# Patient Record
Sex: FEMALE | Race: WHITE | NOT HISPANIC OR LATINO | Employment: OTHER | URBAN - METROPOLITAN AREA
[De-identification: names, ages, dates, MRNs, and addresses within clinical notes are randomized per-mention and may not be internally consistent; named-entity substitution may affect disease eponyms.]

---

## 2023-03-21 LAB
INR IN PPP BY COAGULATION ASSAY: 1.4 (ref 0.9–1.1)
PROTHROMBIN TIME (PT) IN PPP BY COAGULATION ASSAY: 15.9 SEC (ref 9.8–13.4)

## 2023-03-28 LAB
INR IN PPP BY COAGULATION ASSAY: 1.9 (ref 0.9–1.1)
PROTHROMBIN TIME (PT) IN PPP BY COAGULATION ASSAY: 21.8 SEC (ref 9.8–13.4)

## 2023-04-04 LAB
INR IN PPP BY COAGULATION ASSAY: 2.5 (ref 0.9–1.1)
PROTHROMBIN TIME (PT) IN PPP BY COAGULATION ASSAY: 28.9 SEC (ref 9.8–13.4)

## 2023-04-11 LAB
ANION GAP IN SER/PLAS: 14 MMOL/L (ref 10–20)
CALCIUM (MG/DL) IN SER/PLAS: 8.8 MG/DL (ref 8.6–10.3)
CARBON DIOXIDE, TOTAL (MMOL/L) IN SER/PLAS: 24 MMOL/L (ref 21–32)
CHLORIDE (MMOL/L) IN SER/PLAS: 102 MMOL/L (ref 98–107)
CREATININE (MG/DL) IN SER/PLAS: 1.33 MG/DL (ref 0.5–1.05)
DIGOXIN (NG/ML) IN SER/PLAS: 1.76 NG/ML (ref 0.8–2)
ERYTHROCYTE DISTRIBUTION WIDTH (RATIO) BY AUTOMATED COUNT: 14 % (ref 11.5–14.5)
ERYTHROCYTE MEAN CORPUSCULAR HEMOGLOBIN CONCENTRATION (G/DL) BY AUTOMATED: 32.4 G/DL (ref 32–36)
ERYTHROCYTE MEAN CORPUSCULAR VOLUME (FL) BY AUTOMATED COUNT: 92 FL (ref 80–100)
ERYTHROCYTES (10*6/UL) IN BLOOD BY AUTOMATED COUNT: 4.44 X10E12/L (ref 4–5.2)
GFR FEMALE: 42 ML/MIN/1.73M2
GLUCOSE (MG/DL) IN SER/PLAS: 93 MG/DL (ref 74–99)
HEMATOCRIT (%) IN BLOOD BY AUTOMATED COUNT: 40.8 % (ref 36–46)
HEMOGLOBIN (G/DL) IN BLOOD: 13.2 G/DL (ref 12–16)
INR IN PPP BY COAGULATION ASSAY: 5.7 (ref 0.9–1.1)
LEUKOCYTES (10*3/UL) IN BLOOD BY AUTOMATED COUNT: 4.9 X10E9/L (ref 4.4–11.3)
NRBC (PER 100 WBCS) BY AUTOMATED COUNT: 0 /100 WBC (ref 0–0)
PLATELETS (10*3/UL) IN BLOOD AUTOMATED COUNT: 251 X10E9/L (ref 150–450)
POTASSIUM (MMOL/L) IN SER/PLAS: 4.9 MMOL/L (ref 3.5–5.3)
PROTHROMBIN TIME (PT) IN PPP BY COAGULATION ASSAY: 67.7 SEC (ref 9.8–13.4)
SODIUM (MMOL/L) IN SER/PLAS: 135 MMOL/L (ref 136–145)
THYROTROPIN (MIU/L) IN SER/PLAS BY DETECTION LIMIT <= 0.05 MIU/L: 2.69 MIU/L (ref 0.44–3.98)
UREA NITROGEN (MG/DL) IN SER/PLAS: 19 MG/DL (ref 6–23)

## 2023-04-18 LAB
INR IN PPP BY COAGULATION ASSAY: 2.1 (ref 0.9–1.1)
PROTHROMBIN TIME (PT) IN PPP BY COAGULATION ASSAY: 24.1 SEC (ref 9.8–13.4)

## 2023-05-09 LAB
INR IN PPP BY COAGULATION ASSAY: 3 (ref 0.9–1.1)
PROTHROMBIN TIME (PT) IN PPP BY COAGULATION ASSAY: 34.6 SEC (ref 9.8–13.4)

## 2023-05-16 LAB
INR IN PPP BY COAGULATION ASSAY: 3.1 (ref 0.9–1.1)
PROTHROMBIN TIME (PT) IN PPP BY COAGULATION ASSAY: 35.8 SEC (ref 9.8–13.4)

## 2023-06-06 LAB
INR IN PPP BY COAGULATION ASSAY: 2.1 (ref 0.9–1.1)
PROTHROMBIN TIME (PT) IN PPP BY COAGULATION ASSAY: 24.3 SEC (ref 9.8–13.4)

## 2023-06-20 LAB
ALANINE AMINOTRANSFERASE (SGPT) (U/L) IN SER/PLAS: 26 U/L (ref 7–45)
ALBUMIN (G/DL) IN SER/PLAS: 3.7 G/DL (ref 3.4–5)
ALKALINE PHOSPHATASE (U/L) IN SER/PLAS: 78 U/L (ref 33–136)
ANION GAP IN SER/PLAS: 13 MMOL/L (ref 10–20)
ASPARTATE AMINOTRANSFERASE (SGOT) (U/L) IN SER/PLAS: 30 U/L (ref 9–39)
BILIRUBIN TOTAL (MG/DL) IN SER/PLAS: 1.1 MG/DL (ref 0–1.2)
CALCIDIOL (25 OH VITAMIN D3) (NG/ML) IN SER/PLAS: 49 NG/ML
CALCIUM (MG/DL) IN SER/PLAS: 9.1 MG/DL (ref 8.6–10.3)
CARBON DIOXIDE, TOTAL (MMOL/L) IN SER/PLAS: 30 MMOL/L (ref 21–32)
CHLORIDE (MMOL/L) IN SER/PLAS: 101 MMOL/L (ref 98–107)
CHOLESTEROL (MG/DL) IN SER/PLAS: 146 MG/DL (ref 0–199)
CHOLESTEROL IN HDL (MG/DL) IN SER/PLAS: 36.5 MG/DL
CHOLESTEROL/HDL RATIO: 4
CREATININE (MG/DL) IN SER/PLAS: 1.34 MG/DL (ref 0.5–1.05)
ERYTHROCYTE DISTRIBUTION WIDTH (RATIO) BY AUTOMATED COUNT: 13.2 % (ref 11.5–14.5)
ERYTHROCYTE MEAN CORPUSCULAR HEMOGLOBIN CONCENTRATION (G/DL) BY AUTOMATED: 31.9 G/DL (ref 32–36)
ERYTHROCYTE MEAN CORPUSCULAR VOLUME (FL) BY AUTOMATED COUNT: 96 FL (ref 80–100)
ERYTHROCYTES (10*6/UL) IN BLOOD BY AUTOMATED COUNT: 4.22 X10E12/L (ref 4–5.2)
GFR FEMALE: 41 ML/MIN/1.73M2
GLUCOSE (MG/DL) IN SER/PLAS: 104 MG/DL (ref 74–99)
HEMATOCRIT (%) IN BLOOD BY AUTOMATED COUNT: 40.7 % (ref 36–46)
HEMOGLOBIN (G/DL) IN BLOOD: 13 G/DL (ref 12–16)
INR IN PPP BY COAGULATION ASSAY: 3.2 (ref 0.9–1.1)
INR IN PPP BY COAGULATION ASSAY: NORMAL
LDL: 83 MG/DL (ref 0–99)
LEUKOCYTES (10*3/UL) IN BLOOD BY AUTOMATED COUNT: 7 X10E9/L (ref 4.4–11.3)
NRBC (PER 100 WBCS) BY AUTOMATED COUNT: 0 /100 WBC (ref 0–0)
PLATELETS (10*3/UL) IN BLOOD AUTOMATED COUNT: 261 X10E9/L (ref 150–450)
POTASSIUM (MMOL/L) IN SER/PLAS: 3.2 MMOL/L (ref 3.5–5.3)
PROTEIN TOTAL: 7 G/DL (ref 6.4–8.2)
PROTHROMBIN TIME (PT) IN PPP BY COAGULATION ASSAY: 37.4 SEC (ref 9.8–13.4)
PROTHROMBIN TIME (PT) IN PPP BY COAGULATION ASSAY: NORMAL
SODIUM (MMOL/L) IN SER/PLAS: 141 MMOL/L (ref 136–145)
TRIGLYCERIDE (MG/DL) IN SER/PLAS: 135 MG/DL (ref 0–149)
UREA NITROGEN (MG/DL) IN SER/PLAS: 17 MG/DL (ref 6–23)
VLDL: 27 MG/DL (ref 0–40)

## 2023-08-08 LAB
INR IN PPP BY COAGULATION ASSAY: 2.3 (ref 0.9–1.1)
PROTHROMBIN TIME (PT) IN PPP BY COAGULATION ASSAY: 26.7 SEC (ref 9.8–12.8)

## 2023-08-22 LAB
INR IN PPP BY COAGULATION ASSAY: 2.5 (ref 0.9–1.1)
PROTHROMBIN TIME (PT) IN PPP BY COAGULATION ASSAY: 28.8 SEC (ref 9.8–12.8)

## 2023-09-12 LAB
INR IN PPP BY COAGULATION ASSAY: 2.3 (ref 0.9–1.1)
PROTHROMBIN TIME (PT) IN PPP BY COAGULATION ASSAY: 26.3 SEC (ref 9.8–12.8)

## 2023-10-03 ENCOUNTER — LAB REQUISITION (OUTPATIENT)
Dept: LAB | Facility: HOSPITAL | Age: 73
End: 2023-10-03
Payer: COMMERCIAL

## 2023-10-03 DIAGNOSIS — Z79.01 LONG TERM (CURRENT) USE OF ANTICOAGULANTS: ICD-10-CM

## 2023-10-03 LAB
INR PPP: 2.1 (ref 0.9–1.1)
PROTHROMBIN TIME: 23.4 SECONDS (ref 9.8–12.8)

## 2023-10-03 PROCEDURE — 85610 PROTHROMBIN TIME: CPT | Mod: OUT | Performed by: INTERNAL MEDICINE

## 2023-10-10 ENCOUNTER — LAB REQUISITION (OUTPATIENT)
Dept: LAB | Facility: HOSPITAL | Age: 73
End: 2023-10-10
Payer: COMMERCIAL

## 2023-10-10 DIAGNOSIS — Z79.01 LONG TERM (CURRENT) USE OF ANTICOAGULANTS: ICD-10-CM

## 2023-10-10 LAB
INR PPP: 2.2 (ref 0.9–1.1)
PROTHROMBIN TIME: 24.9 SECONDS (ref 9.8–12.8)

## 2023-10-10 PROCEDURE — 85610 PROTHROMBIN TIME: CPT | Mod: OUT | Performed by: INTERNAL MEDICINE

## 2023-10-20 PROBLEM — I50.9 CONGESTIVE HEART FAILURE (CHF) (MULTI): Status: ACTIVE | Noted: 2023-10-20

## 2023-10-20 PROBLEM — N18.30 CKD (CHRONIC KIDNEY DISEASE), STAGE III (MULTI): Status: ACTIVE | Noted: 2020-06-24

## 2023-10-20 PROBLEM — R63.4 ABNORMAL WEIGHT LOSS: Status: ACTIVE | Noted: 2023-10-20

## 2023-10-20 PROBLEM — R53.1 GENERALIZED WEAKNESS: Status: ACTIVE | Noted: 2019-10-14

## 2023-10-20 PROBLEM — M85.89 OSTEOPENIA OF MULTIPLE SITES: Status: ACTIVE | Noted: 2022-02-23

## 2023-10-20 PROBLEM — I48.91 A-FIB (MULTI): Status: ACTIVE | Noted: 2023-10-20

## 2023-10-20 PROBLEM — E78.5 HYPERLIPIDEMIA: Status: ACTIVE | Noted: 2023-10-20

## 2023-10-20 PROBLEM — I10 HYPERTENSION: Status: ACTIVE | Noted: 2023-10-20

## 2023-10-20 PROBLEM — E44.0 MODERATE PROTEIN-CALORIE MALNUTRITION (MULTI): Status: ACTIVE | Noted: 2019-07-22

## 2023-10-20 PROBLEM — T45.511A WARFARIN TOXICITY: Status: ACTIVE | Noted: 2019-07-22

## 2023-10-20 PROBLEM — D68.9 COAGULOPATHY (MULTI): Status: ACTIVE | Noted: 2019-09-28

## 2023-10-20 PROBLEM — R23.2 HOT FLASHES: Status: ACTIVE | Noted: 2022-02-23

## 2023-10-20 RX ORDER — ADHESIVE BANDAGE
30 BANDAGE TOPICAL
COMMUNITY

## 2023-10-20 RX ORDER — METOPROLOL TARTRATE 100 MG/1
100 TABLET ORAL EVERY 12 HOURS
COMMUNITY
Start: 2019-10-04 | End: 2023-10-27

## 2023-10-20 RX ORDER — PNV NO.95/FERROUS FUM/FOLIC AC 28MG-0.8MG
2 TABLET ORAL
COMMUNITY

## 2023-10-20 RX ORDER — POTASSIUM CHLORIDE 750 MG/1
10 TABLET, FILM COATED, EXTENDED RELEASE ORAL DAILY
COMMUNITY

## 2023-10-20 RX ORDER — TRIAMCINOLONE ACETONIDE 1 MG/G
CREAM TOPICAL 2 TIMES DAILY
COMMUNITY
End: 2024-06-03 | Stop reason: ALTCHOICE

## 2023-10-20 RX ORDER — KETOCONAZOLE 20 MG/ML
SHAMPOO, SUSPENSION TOPICAL
COMMUNITY

## 2023-10-20 RX ORDER — ALENDRONATE SODIUM 70 MG/1
TABLET ORAL
COMMUNITY
Start: 2020-07-27

## 2023-10-20 RX ORDER — MIDODRINE HYDROCHLORIDE 5 MG/1
5 TABLET ORAL 3 TIMES DAILY
COMMUNITY
Start: 2023-04-15

## 2023-10-20 RX ORDER — MECLIZINE HYDROCHLORIDE 25 MG/1
TABLET ORAL
COMMUNITY

## 2023-10-20 RX ORDER — GUAIFENESIN/DEXTROMETHORPHAN 100-10MG/5
SYRUP ORAL
COMMUNITY

## 2023-10-20 RX ORDER — DIGOXIN 125 MCG
TABLET ORAL
COMMUNITY
Start: 2023-04-14

## 2023-10-20 RX ORDER — ACETAMINOPHEN 325 MG/1
650 TABLET ORAL EVERY 8 HOURS PRN
COMMUNITY
Start: 2019-07-22

## 2023-10-20 RX ORDER — AMIODARONE HYDROCHLORIDE 200 MG/1
0.5 TABLET ORAL DAILY
COMMUNITY

## 2023-10-20 RX ORDER — ONDANSETRON 4 MG/1
TABLET, FILM COATED ORAL
COMMUNITY

## 2023-10-20 RX ORDER — VIT C/E/ZN/COPPR/LUTEIN/ZEAXAN 250MG-90MG
2000 CAPSULE ORAL
COMMUNITY
Start: 2020-01-06

## 2023-10-20 RX ORDER — ATORVASTATIN CALCIUM 10 MG/1
1 TABLET, FILM COATED ORAL NIGHTLY
COMMUNITY
Start: 2019-08-27

## 2023-10-20 RX ORDER — ANASTROZOLE 1 MG/1
1 TABLET ORAL
COMMUNITY
Start: 2021-02-11

## 2023-10-20 RX ORDER — FUROSEMIDE 20 MG/1
20 TABLET ORAL
COMMUNITY
Start: 2019-07-22 | End: 2023-10-27

## 2023-10-20 RX ORDER — POLYETHYLENE GLYCOL 3350 17 G/17G
POWDER, FOR SOLUTION ORAL
COMMUNITY

## 2023-10-20 RX ORDER — WARFARIN 2 MG/1
2 TABLET ORAL
COMMUNITY
Start: 2020-08-03 | End: 2024-04-02 | Stop reason: ALTCHOICE

## 2023-10-20 RX ORDER — AMMONIUM LACTATE 12 G/100G
LOTION TOPICAL
COMMUNITY

## 2023-10-20 RX ORDER — DIPHENHYDRAMINE HCL 25 MG
25 CAPSULE ORAL EVERY 6 HOURS PRN
COMMUNITY
End: 2024-06-03 | Stop reason: ALTCHOICE

## 2023-10-20 RX ORDER — HYDROCORTISONE 1 %
CREAM (GRAM) TOPICAL
COMMUNITY

## 2023-10-20 RX ORDER — OMEPRAZOLE 40 MG/1
40 CAPSULE, DELAYED RELEASE ORAL 2 TIMES DAILY
COMMUNITY

## 2023-10-20 RX ORDER — OMEPRAZOLE 20 MG/1
20 TABLET, DELAYED RELEASE ORAL DAILY
COMMUNITY
End: 2024-06-03 | Stop reason: ALTCHOICE

## 2023-10-23 ENCOUNTER — APPOINTMENT (OUTPATIENT)
Dept: CARDIOLOGY | Facility: CLINIC | Age: 73
End: 2023-10-23
Payer: COMMERCIAL

## 2023-10-24 ENCOUNTER — LAB REQUISITION (OUTPATIENT)
Dept: LAB | Facility: HOSPITAL | Age: 73
End: 2023-10-24
Payer: COMMERCIAL

## 2023-10-24 DIAGNOSIS — Z79.01 LONG TERM (CURRENT) USE OF ANTICOAGULANTS: ICD-10-CM

## 2023-10-24 LAB
INR PPP: 1.7 (ref 0.9–1.1)
PROTHROMBIN TIME: 19.8 SECONDS (ref 9.8–12.8)

## 2023-10-24 PROCEDURE — 85610 PROTHROMBIN TIME: CPT | Mod: OUT | Performed by: INTERNAL MEDICINE

## 2023-10-27 ENCOUNTER — LAB REQUISITION (OUTPATIENT)
Dept: LAB | Facility: HOSPITAL | Age: 73
End: 2023-10-27
Payer: COMMERCIAL

## 2023-10-27 DIAGNOSIS — Z79.01 LONG TERM (CURRENT) USE OF ANTICOAGULANTS: ICD-10-CM

## 2023-10-27 LAB
INR PPP: 1.7 (ref 0.9–1.1)
PROTHROMBIN TIME: 19.7 SECONDS (ref 9.8–12.8)

## 2023-10-27 PROCEDURE — 85610 PROTHROMBIN TIME: CPT | Mod: OUT | Performed by: INTERNAL MEDICINE

## 2023-10-27 RX ORDER — METOPROLOL TARTRATE 50 MG/1
TABLET ORAL
COMMUNITY
Start: 2023-06-21

## 2023-10-27 RX ORDER — ALBUTEROL SULFATE 0.83 MG/ML
SOLUTION RESPIRATORY (INHALATION)
COMMUNITY
Start: 2023-02-17 | End: 2024-06-03 | Stop reason: ALTCHOICE

## 2023-10-27 RX ORDER — FUROSEMIDE 40 MG/1
20 TABLET ORAL DAILY
COMMUNITY
Start: 2023-06-25 | End: 2024-06-03 | Stop reason: ALTCHOICE

## 2023-10-27 RX ORDER — WARFARIN 1 MG/1
TABLET ORAL
COMMUNITY
Start: 2023-10-15 | End: 2024-04-02 | Stop reason: ALTCHOICE

## 2023-10-27 RX ORDER — MIRTAZAPINE 15 MG/1
TABLET, FILM COATED ORAL
COMMUNITY
Start: 2023-07-07 | End: 2024-06-03 | Stop reason: ALTCHOICE

## 2023-10-30 ENCOUNTER — OFFICE VISIT (OUTPATIENT)
Dept: CARDIOLOGY | Facility: CLINIC | Age: 73
End: 2023-10-30
Payer: COMMERCIAL

## 2023-10-30 VITALS
OXYGEN SATURATION: 97 % | HEART RATE: 93 BPM | SYSTOLIC BLOOD PRESSURE: 110 MMHG | BODY MASS INDEX: 24.48 KG/M2 | DIASTOLIC BLOOD PRESSURE: 80 MMHG | WEIGHT: 156 LBS | HEIGHT: 67 IN

## 2023-10-30 DIAGNOSIS — E78.00 PURE HYPERCHOLESTEROLEMIA: ICD-10-CM

## 2023-10-30 DIAGNOSIS — I10 PRIMARY HYPERTENSION: ICD-10-CM

## 2023-10-30 DIAGNOSIS — I48.0 PAROXYSMAL ATRIAL FIBRILLATION (MULTI): Primary | ICD-10-CM

## 2023-10-30 DIAGNOSIS — C50.919 MALIGNANT NEOPLASM OF FEMALE BREAST, UNSPECIFIED ESTROGEN RECEPTOR STATUS, UNSPECIFIED LATERALITY, UNSPECIFIED SITE OF BREAST (MULTI): ICD-10-CM

## 2023-10-30 DIAGNOSIS — I50.32 CHRONIC HEART FAILURE WITH PRESERVED EJECTION FRACTION (MULTI): ICD-10-CM

## 2023-10-30 DIAGNOSIS — N18.30 STAGE 3 CHRONIC KIDNEY DISEASE, UNSPECIFIED WHETHER STAGE 3A OR 3B CKD (MULTI): ICD-10-CM

## 2023-10-30 PROCEDURE — 99214 OFFICE O/P EST MOD 30 MIN: CPT | Performed by: INTERNAL MEDICINE

## 2023-10-30 PROCEDURE — 1160F RVW MEDS BY RX/DR IN RCRD: CPT | Performed by: INTERNAL MEDICINE

## 2023-10-30 PROCEDURE — 93000 ELECTROCARDIOGRAM COMPLETE: CPT | Performed by: INTERNAL MEDICINE

## 2023-10-30 PROCEDURE — 3074F SYST BP LT 130 MM HG: CPT | Performed by: INTERNAL MEDICINE

## 2023-10-30 PROCEDURE — 1159F MED LIST DOCD IN RCRD: CPT | Performed by: INTERNAL MEDICINE

## 2023-10-30 PROCEDURE — 3079F DIAST BP 80-89 MM HG: CPT | Performed by: INTERNAL MEDICINE

## 2023-10-30 PROCEDURE — 1036F TOBACCO NON-USER: CPT | Performed by: INTERNAL MEDICINE

## 2023-10-30 NOTE — PROGRESS NOTES
Slightly in creased dyspnea from anxiety. No palapitions, no chest pain, no edema.      Review of Systems     Physical Exam  Constitutional:       Appearance: Normal appearance.   HENT:      Head: Normocephalic and atraumatic.   Eyes:      Pupils: Pupils are equal, round, and reactive to light.   Cardiovascular:      Rate and Rhythm: Rhythm irregular.      Heart sounds: S1 normal and S2 normal.   Musculoskeletal:      Right lower leg: No edema.      Left lower leg: No edema.   Neurological:      Mental Status: She is alert.          Problem List Items Addressed This Visit          Cardiac and Vasculature    A-fib (CMS/LTAC, located within [facility]) - Primary    Overview     Last Assessment & Plan: Formatting of this note might be different from the original. Assessment: Chronic and taking meds and stable.         Relevant Medications    metoprolol tartrate (Lopressor) 50 mg tablet    Hyperlipidemia    Overview     Last Assessment & Plan: Formatting of this note might be different from the original. Assessment: on Rx, managed by [facility] physician Dr. Landers         Hypertension    Overview     Last Assessment & Plan: Formatting of this note might be different from the original. Assessment: Well controlled with medication and FU routinely with PCP         Relevant Orders    ECG 12 Lead (Completed)    Chronic heart failure with preserved ejection fraction (CMS/LTAC, located within [facility])    Relevant Medications    metoprolol tartrate (Lopressor) 50 mg tablet       Genitourinary and Reproductive    CKD (chronic kidney disease), stage III (CMS/LTAC, located within [facility])    Overview     Last Assessment & Plan: Formatting of this note might be different from the original. Assessment: encouraged to drink more water and FU with PCP            Hematology and Neoplasia    Malignant neoplasm of female breast (CMS/LTAC, located within [facility])

## 2023-11-03 ENCOUNTER — LAB REQUISITION (OUTPATIENT)
Dept: LAB | Facility: HOSPITAL | Age: 73
End: 2023-11-03
Payer: COMMERCIAL

## 2023-11-03 DIAGNOSIS — Z79.01 LONG TERM (CURRENT) USE OF ANTICOAGULANTS: ICD-10-CM

## 2023-11-03 LAB
INR PPP: 1.8 (ref 0.9–1.1)
PROTHROMBIN TIME: 20.8 SECONDS (ref 9.8–12.8)

## 2023-11-03 PROCEDURE — 85610 PROTHROMBIN TIME: CPT | Mod: OUT | Performed by: INTERNAL MEDICINE

## 2023-11-10 ENCOUNTER — LAB REQUISITION (OUTPATIENT)
Dept: LAB | Facility: HOSPITAL | Age: 73
End: 2023-11-10
Payer: COMMERCIAL

## 2023-11-10 DIAGNOSIS — I48.91 UNSPECIFIED ATRIAL FIBRILLATION (MULTI): ICD-10-CM

## 2023-11-10 LAB
INR PPP: 2.7 (ref 0.9–1.1)
PREALB SERPL-MCNC: 23.2 MG/DL (ref 18–40)
PROTHROMBIN TIME: 31.3 SECONDS (ref 9.8–12.8)

## 2023-11-10 PROCEDURE — 84134 ASSAY OF PREALBUMIN: CPT | Mod: OUT,PARLAB | Performed by: INTERNAL MEDICINE

## 2023-11-10 PROCEDURE — 85610 PROTHROMBIN TIME: CPT | Mod: OUT | Performed by: INTERNAL MEDICINE

## 2023-11-17 ENCOUNTER — LAB REQUISITION (OUTPATIENT)
Dept: LAB | Facility: HOSPITAL | Age: 73
End: 2023-11-17
Payer: COMMERCIAL

## 2023-11-17 DIAGNOSIS — I48.91 UNSPECIFIED ATRIAL FIBRILLATION (MULTI): ICD-10-CM

## 2023-11-17 LAB
INR PPP: 2.8 (ref 0.9–1.1)
PROTHROMBIN TIME: 32.3 SECONDS (ref 9.8–12.8)

## 2023-11-17 PROCEDURE — 85610 PROTHROMBIN TIME: CPT | Mod: OUT | Performed by: INTERNAL MEDICINE

## 2023-11-24 ENCOUNTER — LAB REQUISITION (OUTPATIENT)
Dept: LAB | Facility: HOSPITAL | Age: 73
End: 2023-11-24
Payer: COMMERCIAL

## 2023-11-24 DIAGNOSIS — I48.91 UNSPECIFIED ATRIAL FIBRILLATION (MULTI): ICD-10-CM

## 2023-11-24 LAB
INR PPP: 3.1 (ref 0.9–1.1)
PROTHROMBIN TIME: 35 SECONDS (ref 9.8–12.8)

## 2023-11-24 PROCEDURE — 85610 PROTHROMBIN TIME: CPT | Mod: OUT | Performed by: INTERNAL MEDICINE

## 2023-11-28 ENCOUNTER — LAB REQUISITION (OUTPATIENT)
Dept: LAB | Facility: HOSPITAL | Age: 73
End: 2023-11-28
Payer: COMMERCIAL

## 2023-11-28 DIAGNOSIS — I48.91 UNSPECIFIED ATRIAL FIBRILLATION (MULTI): ICD-10-CM

## 2023-11-28 LAB
INR PPP: 1.9 (ref 0.9–1.1)
PROTHROMBIN TIME: 21 SECONDS (ref 9.8–12.8)

## 2023-11-28 PROCEDURE — 85610 PROTHROMBIN TIME: CPT | Mod: OUT | Performed by: INTERNAL MEDICINE

## 2024-02-12 PROBLEM — I48.91 ATRIAL FIBRILLATION (MULTI): Status: ACTIVE | Noted: 2023-09-19

## 2024-02-12 PROBLEM — E78.00 PURE HYPERCHOLESTEROLEMIA: Status: ACTIVE | Noted: 2023-03-18

## 2024-02-12 PROBLEM — E78.00 PURE HYPERCHOLESTEROLEMIA: Status: RESOLVED | Noted: 2023-03-18 | Resolved: 2024-02-12

## 2024-03-26 PROBLEM — R11.2 NAUSEA AND VOMITING: Status: ACTIVE | Noted: 2024-03-26

## 2024-04-02 ENCOUNTER — OFFICE VISIT (OUTPATIENT)
Dept: CARDIOLOGY | Facility: CLINIC | Age: 74
End: 2024-04-02
Payer: COMMERCIAL

## 2024-04-02 VITALS
HEART RATE: 97 BPM | OXYGEN SATURATION: 98 % | DIASTOLIC BLOOD PRESSURE: 64 MMHG | SYSTOLIC BLOOD PRESSURE: 110 MMHG | WEIGHT: 168 LBS | HEIGHT: 67 IN | BODY MASS INDEX: 26.37 KG/M2

## 2024-04-02 DIAGNOSIS — I10 PRIMARY HYPERTENSION: ICD-10-CM

## 2024-04-02 DIAGNOSIS — I48.11 LONGSTANDING PERSISTENT ATRIAL FIBRILLATION (MULTI): Primary | ICD-10-CM

## 2024-04-02 DIAGNOSIS — N18.30 STAGE 3 CHRONIC KIDNEY DISEASE, UNSPECIFIED WHETHER STAGE 3A OR 3B CKD (MULTI): ICD-10-CM

## 2024-04-02 DIAGNOSIS — E78.00 PURE HYPERCHOLESTEROLEMIA: ICD-10-CM

## 2024-04-02 DIAGNOSIS — I50.32 CHRONIC HEART FAILURE WITH PRESERVED EJECTION FRACTION (MULTI): ICD-10-CM

## 2024-04-02 DIAGNOSIS — C50.919 MALIGNANT NEOPLASM OF FEMALE BREAST, UNSPECIFIED ESTROGEN RECEPTOR STATUS, UNSPECIFIED LATERALITY, UNSPECIFIED SITE OF BREAST (MULTI): ICD-10-CM

## 2024-04-02 PROCEDURE — 3074F SYST BP LT 130 MM HG: CPT | Performed by: INTERNAL MEDICINE

## 2024-04-02 PROCEDURE — 1160F RVW MEDS BY RX/DR IN RCRD: CPT | Performed by: INTERNAL MEDICINE

## 2024-04-02 PROCEDURE — 1159F MED LIST DOCD IN RCRD: CPT | Performed by: INTERNAL MEDICINE

## 2024-04-02 PROCEDURE — 99214 OFFICE O/P EST MOD 30 MIN: CPT | Performed by: INTERNAL MEDICINE

## 2024-04-02 PROCEDURE — 3078F DIAST BP <80 MM HG: CPT | Performed by: INTERNAL MEDICINE

## 2024-04-02 PROCEDURE — 1036F TOBACCO NON-USER: CPT | Performed by: INTERNAL MEDICINE

## 2024-04-02 NOTE — PROGRESS NOTES
"  Subjective  Adriana Holder  is a 73 y.o. year old female who presents for chronic atrial fibrillation.  No chest pain, no dyspnea, no palpaitions, no edema.  Notes post nasal drip.    Blood pressure 110/64, pulse 97, height 1.702 m (5' 7\"), weight 76.2 kg (168 lb), SpO2 98 %.   Ibuprofen and Codeine  History reviewed. No pertinent past medical history.  History reviewed. No pertinent surgical history.  Family History   Problem Relation Name Age of Onset    Coronary artery disease Mother      Heart attack Mother       @SOC    Current Outpatient Medications   Medication Sig Dispense Refill    furosemide (Lasix) 40 mg tablet Take 0.5 tablets (20 mg) by mouth once daily.      acetaminophen (Tylenol) 325 mg tablet Take 2 tablets (650 mg) by mouth every 8 hours if needed.      albuterol 2.5 mg /3 mL (0.083 %) nebulizer solution       alendronate (Fosamax) 70 mg tablet TAKE 1 TABLET ONCE A WEEK IN MORNING WITH GLASS OF WATER EMPTY STOMACH DONT LIE DOWN FOR 30 MINUTES      amiodarone (Pacerone) 200 mg tablet Take 0.5 tablets (100 mg) by mouth once daily.      ammonium lactate (Lac-Hydrin) 12 % lotion Apply and rub in a thin film to affected areas twice daily (am and pm).      anastrozole (Arimidex) 1 mg tablet Take 1 tablet (1 mg total) by mouth once daily.      apixaban (Eliquis) 5 mg tablet Take 1 tablet (5 mg) by mouth 2 times a day.      atorvastatin (Lipitor) 10 mg tablet Take 1 tablet (10 mg) by mouth once daily at bedtime.      BISACODYL ORAL Insert 1 suppository rectally once daily      calcium carbonate-vitamin D3 (Oscal-500) 500 mg-10 mcg (400 unit) tablet Take 2 tablets by mouth once daily.      calcium carbonate-vitamin D3 600 mg-5 mcg (200 unit) capsule Take 2 capsules by mouth once daily.      cholecalciferol (Vitamin D-3) 25 MCG (1000 UT) capsule Take 2 capsules (50 mcg) by mouth once daily.      dextromethorphan-guaifenesin (Robitussin DM)  mg/5 mL oral liquid Take by mouth.      digoxin (Lanoxin) 125 " MCG tablet Take by mouth.      diphenhydrAMINE (BENADryl) 25 mg capsule Take 1 capsule (25 mg) by mouth every 6 hours if needed.      DOCUSATE SODIUM ORAL Take by mouth.      hydrocortisone 1 % cream Apply sparingly and rub in well to affected area (s) as directed.      ketoconazole (NIZOral) 2 % shampoo Shampoo hair/scalp as directed      magnesium hydroxide (Milk of Magnesia) 400 mg/5 mL suspension Take 30 mL by mouth once daily.      meclizine (Antivert) 25 mg tablet Take by mouth.      metoprolol tartrate (Lopressor) 50 mg tablet       midodrine (Proamatine) 5 mg tablet Take 1 tablet (5 mg) by mouth 3 times a day.      mirtazapine (Remeron) 15 mg tablet       omeprazole (PriLOSEC) 40 mg DR capsule Take 1 capsule (40 mg) by mouth 2 times a day. Do not crush or chew.      omeprazole OTC (PriLOSEC OTC) 20 mg EC tablet Take 1 tablet (20 mg) by mouth once daily. Do not crush, chew, or split.      ondansetron (Zofran) 4 mg tablet Take 4 mg by mouth every 8 hours as needed.      polyethylene glycol (Glycolax, Miralax) 17 gram/dose powder Take by mouth once daily. Dissolve dose in 4 - 8 ounces of liquid and take as directed.      potassium chloride CR 10 mEq ER tablet Take 1 tablet (10 mEq) by mouth once daily. Do not crush, chew, or split.      triamcinolone (Kenalog) 0.1 % cream Apply topically twice a day.       No current facility-administered medications for this visit.        ROS  Review of Systems   All other systems reviewed and are negative.      Physical Exam  Physical Exam  Constitutional:       Appearance: Normal appearance.   Cardiovascular:      Rate and Rhythm: Rhythm irregularly irregular.   Pulmonary:      Effort: Pulmonary effort is normal.      Breath sounds: Normal breath sounds.   Abdominal:      General: Abdomen is flat.   Musculoskeletal:      Right lower leg: No edema.      Left lower leg: No edema.   Skin:     General: Skin is warm and dry.   Neurological:      General: No focal deficit present.       Mental Status: She is oriented to person, place, and time.   Psychiatric:         Mood and Affect: Mood normal.         Behavior: Behavior normal.          EKG  Encounter Date: 10/30/23   ECG 12 Lead    Narrative    Atrial fibrillation at 93/min., Dig. Effect, vertical axis       Problem List Items Addressed This Visit       Atrial fibrillation (CMS/HCC) - Primary    Relevant Orders    Follow Up In Cardiology    CKD (chronic kidney disease), stage III (CMS/HCC)    Hyperlipidemia    Hypertension    Chronic heart failure with preserved ejection fraction (CMS/HCC)    Relevant Orders    Follow Up In Cardiology    Malignant neoplasm of female breast (CMS/HCC)         Same meds with return 6 months with EKG      Abelardo Vidales MD

## 2024-04-30 ENCOUNTER — APPOINTMENT (OUTPATIENT)
Dept: RADIOLOGY | Facility: HOSPITAL | Age: 74
End: 2024-04-30
Payer: COMMERCIAL

## 2024-04-30 ENCOUNTER — HOSPITAL ENCOUNTER (EMERGENCY)
Facility: HOSPITAL | Age: 74
Discharge: HOME | End: 2024-04-30
Attending: STUDENT IN AN ORGANIZED HEALTH CARE EDUCATION/TRAINING PROGRAM
Payer: COMMERCIAL

## 2024-04-30 VITALS
WEIGHT: 167 LBS | TEMPERATURE: 98.1 F | DIASTOLIC BLOOD PRESSURE: 78 MMHG | HEIGHT: 72 IN | BODY MASS INDEX: 22.62 KG/M2 | OXYGEN SATURATION: 94 % | SYSTOLIC BLOOD PRESSURE: 141 MMHG | HEART RATE: 110 BPM | RESPIRATION RATE: 17 BRPM

## 2024-04-30 DIAGNOSIS — E04.1 THYROID NODULE: ICD-10-CM

## 2024-04-30 DIAGNOSIS — R91.1 LUNG NODULE: ICD-10-CM

## 2024-04-30 DIAGNOSIS — R03.0 ELEVATED BLOOD PRESSURE READING: ICD-10-CM

## 2024-04-30 DIAGNOSIS — I71.20 THORACIC AORTIC ANEURYSM WITHOUT RUPTURE, UNSPECIFIED PART (CMS-HCC): ICD-10-CM
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
## 2024-04-30 LAB
ABO GROUP (TYPE) IN BLOOD: NORMAL
ALBUMIN SERPL BCP-MCNC: 4.4 G/DL (ref 3.4–5)
ALP SERPL-CCNC: 84 U/L (ref 33–136)
ALT SERPL W P-5'-P-CCNC: 21 U/L (ref 7–45)
ANION GAP SERPL CALC-SCNC: 16 MMOL/L (ref 10–20)
ANTIBODY SCREEN: NORMAL
AST SERPL W P-5'-P-CCNC: 21 U/L (ref 9–39)
BASOPHILS # BLD AUTO: 0.03 X10*3/UL (ref 0–0.1)
BASOPHILS NFR BLD AUTO: 0.4 %
BILIRUB SERPL-MCNC: 1.3 MG/DL (ref 0–1.2)
BUN SERPL-MCNC: 28 MG/DL (ref 6–23)
CALCIUM SERPL-MCNC: 9.7 MG/DL (ref 8.6–10.3)
CHLORIDE SERPL-SCNC: 102 MMOL/L (ref 98–107)
CO2 SERPL-SCNC: 28 MMOL/L (ref 21–32)
CREAT SERPL-MCNC: 1.18 MG/DL (ref 0.5–1.05)
EGFRCR SERPLBLD CKD-EPI 2021: 48 ML/MIN/1.73M*2
EOSINOPHIL # BLD AUTO: 0.2 X10*3/UL (ref 0–0.4)
EOSINOPHIL NFR BLD AUTO: 2.9 %
ERYTHROCYTE [DISTWIDTH] IN BLOOD BY AUTOMATED COUNT: 13 % (ref 11.5–14.5)
ETHANOL SERPL-MCNC: <10 MG/DL
GLUCOSE SERPL-MCNC: 123 MG/DL (ref 74–99)
HCT VFR BLD AUTO: 41.7 % (ref 36–46)
HGB BLD-MCNC: 13.6 G/DL (ref 12–16)
IMM GRANULOCYTES # BLD AUTO: 0.02 X10*3/UL (ref 0–0.5)
IMM GRANULOCYTES NFR BLD AUTO: 0.3 % (ref 0–0.9)
INR PPP: 1.7 (ref 0.9–1.1)
LACTATE SERPL-SCNC: 2 MMOL/L (ref 0.4–2)
LYMPHOCYTES # BLD AUTO: 1.84 X10*3/UL (ref 0.8–3)
LYMPHOCYTES NFR BLD AUTO: 26.6 %
MCH RBC QN AUTO: 29.8 PG (ref 26–34)
MCHC RBC AUTO-ENTMCNC: 32.6 G/DL (ref 32–36)
MCV RBC AUTO: 91 FL (ref 80–100)
MONOCYTES # BLD AUTO: 0.53 X10*3/UL (ref 0.05–0.8)
MONOCYTES NFR BLD AUTO: 7.7 %
NEUTROPHILS # BLD AUTO: 4.3 X10*3/UL (ref 1.6–5.5)
NEUTROPHILS NFR BLD AUTO: 62.1 %
NRBC BLD-RTO: 0 /100 WBCS (ref 0–0)
PLATELET # BLD AUTO: 219 X10*3/UL (ref 150–450)
POTASSIUM SERPL-SCNC: 4.1 MMOL/L (ref 3.5–5.3)
PROT SERPL-MCNC: 8.3 G/DL (ref 6.4–8.2)
PROTHROMBIN TIME: 18.8 SECONDS (ref 9.8–12.8)
RBC # BLD AUTO: 4.57 X10*6/UL (ref 4–5.2)
RH FACTOR (ANTIGEN D): NORMAL
SODIUM SERPL-SCNC: 142 MMOL/L (ref 136–145)
WBC # BLD AUTO: 6.9 X10*3/UL (ref 4.4–11.3)

## 2024-04-30 PROCEDURE — 96360 HYDRATION IV INFUSION INIT: CPT

## 2024-04-30 PROCEDURE — 73130 X-RAY EXAM OF HAND: CPT | Mod: LT

## 2024-04-30 PROCEDURE — 71045 X-RAY EXAM CHEST 1 VIEW: CPT

## 2024-04-30 PROCEDURE — G0390 TRAUMA RESPONS W/HOSP CRITI: HCPCS

## 2024-04-30 PROCEDURE — 72131 CT LUMBAR SPINE W/O DYE: CPT | Performed by: RADIOLOGY

## 2024-04-30 PROCEDURE — 36415 COLL VENOUS BLD VENIPUNCTURE: CPT | Performed by: PHYSICIAN ASSISTANT

## 2024-04-30 PROCEDURE — 2500000004 HC RX 250 GENERAL PHARMACY W/ HCPCS (ALT 636 FOR OP/ED): Performed by: STUDENT IN AN ORGANIZED HEALTH CARE EDUCATION/TRAINING PROGRAM

## 2024-04-30 PROCEDURE — 82077 ASSAY SPEC XCP UR&BREATH IA: CPT | Performed by: PHYSICIAN ASSISTANT

## 2024-04-30 PROCEDURE — 2550000001 HC RX 255 CONTRASTS: Performed by: STUDENT IN AN ORGANIZED HEALTH CARE EDUCATION/TRAINING PROGRAM

## 2024-04-30 PROCEDURE — 73552 X-RAY EXAM OF FEMUR 2/>: CPT | Mod: BILATERAL PROCEDURE | Performed by: RADIOLOGY

## 2024-04-30 PROCEDURE — 72125 CT NECK SPINE W/O DYE: CPT

## 2024-04-30 PROCEDURE — 73110 X-RAY EXAM OF WRIST: CPT | Mod: LEFT SIDE | Performed by: RADIOLOGY

## 2024-04-30 PROCEDURE — 86901 BLOOD TYPING SEROLOGIC RH(D): CPT | Performed by: PHYSICIAN ASSISTANT

## 2024-04-30 PROCEDURE — 70450 CT HEAD/BRAIN W/O DYE: CPT | Performed by: RADIOLOGY

## 2024-04-30 PROCEDURE — 2500000005 HC RX 250 GENERAL PHARMACY W/O HCPCS: Performed by: PHYSICIAN ASSISTANT

## 2024-04-30 PROCEDURE — 71045 X-RAY EXAM CHEST 1 VIEW: CPT | Performed by: RADIOLOGY

## 2024-04-30 PROCEDURE — 85610 PROTHROMBIN TIME: CPT | Performed by: PHYSICIAN ASSISTANT

## 2024-04-30 PROCEDURE — 71260 CT THORAX DX C+: CPT | Performed by: RADIOLOGY

## 2024-04-30 PROCEDURE — 73552 X-RAY EXAM OF FEMUR 2/>: CPT | Mod: 50

## 2024-04-30 PROCEDURE — 74177 CT ABD & PELVIS W/CONTRAST: CPT | Performed by: RADIOLOGY

## 2024-04-30 PROCEDURE — 99285 EMERGENCY DEPT VISIT HI MDM: CPT | Mod: 25

## 2024-04-30 PROCEDURE — 73560 X-RAY EXAM OF KNEE 1 OR 2: CPT | Mod: BILATERAL PROCEDURE | Performed by: RADIOLOGY

## 2024-04-30 PROCEDURE — 72170 X-RAY EXAM OF PELVIS: CPT

## 2024-04-30 PROCEDURE — 72128 CT CHEST SPINE W/O DYE: CPT

## 2024-04-30 PROCEDURE — 70450 CT HEAD/BRAIN W/O DYE: CPT

## 2024-04-30 PROCEDURE — 73560 X-RAY EXAM OF KNEE 1 OR 2: CPT | Mod: 50

## 2024-04-30 PROCEDURE — 72128 CT CHEST SPINE W/O DYE: CPT | Performed by: RADIOLOGY

## 2024-04-30 PROCEDURE — 72131 CT LUMBAR SPINE W/O DYE: CPT

## 2024-04-30 PROCEDURE — 73130 X-RAY EXAM OF HAND: CPT | Mod: LEFT SIDE | Performed by: RADIOLOGY

## 2024-04-30 PROCEDURE — 85025 COMPLETE CBC W/AUTO DIFF WBC: CPT | Performed by: PHYSICIAN ASSISTANT

## 2024-04-30 PROCEDURE — 74177 CT ABD & PELVIS W/CONTRAST: CPT

## 2024-04-30 PROCEDURE — 72125 CT NECK SPINE W/O DYE: CPT | Performed by: RADIOLOGY

## 2024-04-30 PROCEDURE — 83605 ASSAY OF LACTIC ACID: CPT | Performed by: PHYSICIAN ASSISTANT

## 2024-04-30 PROCEDURE — 80053 COMPREHEN METABOLIC PANEL: CPT | Performed by: PHYSICIAN ASSISTANT

## 2024-04-30 PROCEDURE — 73110 X-RAY EXAM OF WRIST: CPT | Mod: LT

## 2024-04-30 PROCEDURE — 72170 X-RAY EXAM OF PELVIS: CPT | Performed by: RADIOLOGY

## 2024-04-30 RX ORDER — LIDOCAINE 560 MG/1
1 PATCH PERCUTANEOUS; TOPICAL; TRANSDERMAL DAILY
Status: DISCONTINUED | OUTPATIENT
Start: 2024-04-30 | End: 2024-04-30 | Stop reason: HOSPADM

## 2024-04-30 RX ORDER — LIDOCAINE 50 MG/G
1 PATCH TOPICAL DAILY
Qty: 5 PATCH | Refills: 0 | Status: SHIPPED | OUTPATIENT
Start: 2024-04-30

## 2024-04-30 RX ADMIN — LIDOCAINE 1 PATCH: 4 PATCH TOPICAL at 09:48

## 2024-04-30 RX ADMIN — IOHEXOL 100 ML: 350 INJECTION, SOLUTION INTRAVENOUS at 07:53

## 2024-04-30 RX ADMIN — SODIUM CHLORIDE 250 ML: 9 INJECTION, SOLUTION INTRAVENOUS at 08:23

## 2024-04-30 ASSESSMENT — PAIN - FUNCTIONAL ASSESSMENT
PAIN_FUNCTIONAL_ASSESSMENT: 0-10
PAIN_FUNCTIONAL_ASSESSMENT: 0-10

## 2024-04-30 ASSESSMENT — LIFESTYLE VARIABLES
EVER HAD A DRINK FIRST THING IN THE MORNING TO STEADY YOUR NERVES TO GET RID OF A HANGOVER: NO
EVER FELT BAD OR GUILTY ABOUT YOUR DRINKING: NO
HAVE YOU EVER FELT YOU SHOULD CUT DOWN ON YOUR DRINKING: NO
TOTAL SCORE: 0
HAVE PEOPLE ANNOYED YOU BY CRITICIZING YOUR DRINKING: NO

## 2024-04-30 ASSESSMENT — PAIN SCALES - GENERAL
PAINLEVEL_OUTOF10: 6
PAINLEVEL_OUTOF10: 7
PAINLEVEL_OUTOF10: 6

## 2024-04-30 ASSESSMENT — PAIN DESCRIPTION - PROGRESSION: CLINICAL_PROGRESSION: NOT CHANGED

## 2024-04-30 ASSESSMENT — COLUMBIA-SUICIDE SEVERITY RATING SCALE - C-SSRS
6. HAVE YOU EVER DONE ANYTHING, STARTED TO DO ANYTHING, OR PREPARED TO DO ANYTHING TO END YOUR LIFE?: NO
2. HAVE YOU ACTUALLY HAD ANY THOUGHTS OF KILLING YOURSELF?: NO
1. SINCE LAST CONTACT, HAVE YOU WISHED YOU WERE DEAD OR WISHED YOU COULD GO TO SLEEP AND NOT WAKE UP?: NO

## 2024-04-30 ASSESSMENT — PAIN DESCRIPTION - LOCATION
LOCATION: ABDOMEN
LOCATION: ABDOMEN

## 2024-04-30 ASSESSMENT — PAIN DESCRIPTION - PAIN TYPE: TYPE: ACUTE PAIN

## 2024-04-30 ASSESSMENT — PAIN DESCRIPTION - ORIENTATION: ORIENTATION: LEFT

## 2024-04-30 NOTE — DISCHARGE INSTRUCTIONS
You are found to have a aortic aneurysm which requires follow-up with vascular surgery have given you vascular surgery to establish care with.  You also incidentally have a pulmonary nodule and require follow-up with your primary care physician as well as lung center.  You also have a thyroid nodule which requires follow-up with your primary care doctor.    You are found to have elevated blood pressure here in the emergency department I recommend that you follow-up with your primary care doctor soon as possible to have your blood pressure reevaluated and see if you require either a change in medication or be started on medication.      Please follow-up with your primary care doctor to have repeat labs performed for a kidney.

## 2024-04-30 NOTE — ED TRIAGE NOTES
[facility] AFTER FALLING THIS AM. PT STATES AROUND 0500 SHE WAS CROUCHING OUT OF BED TO PICK SOMETHING UP AND FELL HITTING THE LEFT SIDE OF HER HEAD. ENDORSES LOC. PT ON BLOOD THINNERS. NURSING STAFF STATED THAT PATIENT HAS ALTERED MENTAL STATUS. ON ARRIVAL TO ED, PT ENDORSES LEFT WRIST, LEFT ABDOMINAL PAIN AND HEAD TENDERNESS. DENIES NECK/ BACK PAIN. DENIES CHEST PAIN. DENIES SOB. DENIES URINARY CHANGES. PT A&O X4 AND FOLLOWS COMMANDS.

## 2024-04-30 NOTE — ED PROVIDER NOTES
HPI   No chief complaint on file.      HPI This is a 74-year-old female who presents with limited trauma.  This was an unwitnessed fall but patient herself states that 5 AM she was trying to get out of bed and she rolled out of bed and landed on the floor.  She remembers everything that happened.  Initially she states she was sore all over but especially her left head her left wrist.  He is on Eliquis she speaking of left rib pain as well.  She presents from [facility] in [place].  There was no loss of consciousness.  She speaks a mild left-sided headache.  Denies any neck pain back pain.  No nausea or vomiting no bowel or bladder changes no cough cold fever numbness tingling or weakness.  Speaks of some abdominal pain on the left.  She does have a history of CHF A-fib kidney disease chronic                    No data recorded                   Patient History   No past medical history on file.  No past surgical history on file.  Family History   Problem Relation Name Age of Onset    Coronary artery disease Mother      Heart attack Mother       Social History     Tobacco Use    Smoking status: Never    Smokeless tobacco: Never   Substance Use Topics    Alcohol use: Never    Drug use: Never       Physical Exam   ED Triage Vitals   Temp Pulse Resp BP   -- -- -- --      SpO2 Temp src Heart Rate Source Patient Position   -- -- -- --      BP Location FiO2 (%)     -- --       Physical Exam  Reviewed family history social history and allergies and were noncontributory to current problem.    Review of systems as noted in history of present illness  otherwise negative. All other systems were reviewed and negative.     PMHX: Chronic conditions: reviewd in EMR, relevant history noted in HPI                Surgeries, hospitalizations: reviewed in EMR , relevant history noted in HPI                Medications: reviewed in EMR, relevant history noted in HPI                Allergies: reviewed in EMR, relevant history noted in  HPI      PHYSICAL EXAM:    GENERAL/ CONSTITUTIONAL: Vitals noted, no distress. Alert and oriented  x 3. Non-toxic.  No Drooling or stridor .    HEAD: Normocephalic Atraumatic    EENT: TMs clear. Posterior oropharynx unremarkable. No meningismus. No LAD.  No exudate present. NO  Hemotympanums    EYES: PERRLA EOMI no nystagmus    NECK: Supple. Nontender. No midline tenderness.  Full range of motion    CARDIAC: Regular, rate, rhythm. No murmurs rubs or gallops. No JVD    PULMONARY: Lungs clear bilaterally with good aeration. No wheezes rales or rhonchi. No respiratory distress.  Somewhat limited inspiratory effect secondary to left rib cage pain    GI: Soft, speaks of some mild left-sided abdominal tenderness.  no peritoneal signs. Normoactive bowel sounds. No pulsatile masses.  No guarding or rebound    EXTREMITIES/MUSCULOSKELTAL: No peripheral edema. Negative Homans bilaterally, NVIT, dorsal pedis pulses +2 /4 equal. FROM in all extremities and equal.  Tenderness noted left wrist negative snuffbox tenderness.  Also right hip tenderness.    SKIN: No rash. Intact.     NEURO: No focal neurologic deficits, AXO x 3 answers questions appropriately    PSYCH: appropriate mood and affect    MEDICAL DECISION MAKING:      ED Course & MDM   ED Course as of 04/30/24 1012   Tue Apr 30, 2024   0720 This patient was seen by the advanced practice provider.  I have personally performed a substantiative portion of the encounter.  I have seen and examined the patient; I agree with the workup, evaluation, MDM, management and diagnosis.  The care plan was discussed.    I personally saw the patient and made/approved the management plan and take responsibility for patient management:    My assessment revealed:  74-year-old female history of atrial fibrillation on Eliquis, chronic kidney disease, congestive heart failure presenting to the emergency department after fall out of bed.  She has right hip pain some abdominal pain and left flank  pain.  Primary survey showed no aberrations.  Secondary survey showed tenderness over left rib cage.  No midline tenderness of spine no paraspinal tenderness.  Tenderness over right greater trochanter region.  Patient is a limited trauma activation.  Blood pressure is stable thus fast was deferred CT imaging of the chest abdomen pelvis head and spine along with x-rays of the left wrist and right hip femur region were ordered to rule out traumatic pathology.  Differential diagnosis includes intracranial hemorrhage spinal fracture/dislocation pathology solid organ injury of torso as well as femur fracture/hip fracture as well as wrist fracture. [ZS]   0731 Chest and pelvis x-ray shows no acute traumatic injuries [ZS]   0812 CT imaging shows no acute traumatic injuries on my independent interpretation awaiting radiology read [ZS]   0812 Creatinine of 1.18 which is improved compared to baseline [ZS]   0815 Blood Urea Nitrogen(!): 28 [CV]   0815 Creatinine(!): 1.18 [CV]   0815 EGFR(!): 48 [CV]   0815 Known stage 3 kidney disease,  given 250 bolus [CV]   0816 No intracranial abnormality. Spotty periventricular white matter  disease.   [CV]   0840 Patient was given normal saline 250 cc bolus due to IV hydration for contrast load [ZS]   0844 No cervical vertebral displacement or acute displaced fracture. Mild  scoliosis and multilevel degenerative changes noted. Minimal  debris/thickening in the right sphenoid sinus   [CV]   0859 Heart is enlarged. There is a 4.4 centimeters aneurysm of the  ascending thoracic aorta. Pulmonary arteries enlarged suggesting  pulmonary arterial hypertension.      There are thyroid nodules.      There are numerous slightly prominent left axillary nodes.      There are no acute findings.      ABDOMEN-PELVIS:  No acute abnormality of the abdomen or pelvis.       [CV]   0900 There is motion artifact. There are 2 right upper lobe lung nodules.  The largest measures 11   [CV]   0900 There is motion  artifact. There are 2 right upper lobe lung nodules.  The largest measures 11   [CV]   0941 X-ray on my interpretation shows no acute traumatic injury [ZS]      ED Course User Index  [CV] Neetu Aldridge PA-C  [ZS] Александр Saldaña MD         Diagnoses as of 04/30/24 1012   Elevated blood pressure reading   Lung nodule   Thoracic aortic aneurysm without rupture, unspecified part (CMS-HCC)   Fall, initial encounter   Thyroid nodule     Trauma activation limited trauma with resulting imaging and orders.  No obvious fractures or acute findings.  There is a note of lung nodule on the right.  4.4 cm thoracic ascending aortic aneurysm.  Other imaging unremarkable for any bleed or acute process. No rib Fractures noted  Medical Decision Making    Home-going with appropriate follow-ups.  Procedure  Procedures     Neetu Aldridge PA-C  04/30/24 1013

## 2024-05-02 ENCOUNTER — TELEMEDICINE (OUTPATIENT)
Dept: PRIMARY CARE | Facility: CLINIC | Age: 74
End: 2024-05-02
Payer: COMMERCIAL

## 2024-05-02 DIAGNOSIS — Z85.3 HISTORY OF BREAST CANCER: ICD-10-CM

## 2024-05-02 DIAGNOSIS — R91.8 LUNG NODULES: Primary | ICD-10-CM

## 2024-05-02 PROCEDURE — 1160F RVW MEDS BY RX/DR IN RCRD: CPT | Performed by: NURSE PRACTITIONER

## 2024-05-02 PROCEDURE — 1036F TOBACCO NON-USER: CPT | Performed by: NURSE PRACTITIONER

## 2024-05-02 PROCEDURE — 1159F MED LIST DOCD IN RCRD: CPT | Performed by: NURSE PRACTITIONER

## 2024-05-02 PROCEDURE — 99202 OFFICE O/P NEW SF 15 MIN: CPT | Performed by: NURSE PRACTITIONER

## 2024-05-02 PROCEDURE — 99212 OFFICE O/P EST SF 10 MIN: CPT | Mod: 95 | Performed by: NURSE PRACTITIONER

## 2024-05-02 RX ORDER — FUROSEMIDE 20 MG/1
TABLET ORAL
COMMUNITY
Start: 2024-02-19

## 2024-05-02 SDOH — ECONOMIC STABILITY: FOOD INSECURITY: WITHIN THE PAST 12 MONTHS, THE FOOD YOU BOUGHT JUST DIDN'T LAST AND YOU DIDN'T HAVE MONEY TO GET MORE.: NEVER TRUE

## 2024-05-02 SDOH — ECONOMIC STABILITY: FOOD INSECURITY: WITHIN THE PAST 12 MONTHS, YOU WORRIED THAT YOUR FOOD WOULD RUN OUT BEFORE YOU GOT MONEY TO BUY MORE.: NEVER TRUE

## 2024-05-02 ASSESSMENT — PATIENT HEALTH QUESTIONNAIRE - PHQ9
10. IF YOU CHECKED OFF ANY PROBLEMS, HOW DIFFICULT HAVE THESE PROBLEMS MADE IT FOR YOU TO DO YOUR WORK, TAKE CARE OF THINGS AT HOME, OR GET ALONG WITH OTHER PEOPLE: NOT DIFFICULT AT ALL
1. LITTLE INTEREST OR PLEASURE IN DOING THINGS: SEVERAL DAYS
2. FEELING DOWN, DEPRESSED OR HOPELESS: SEVERAL DAYS
SUM OF ALL RESPONSES TO PHQ9 QUESTIONS 1 AND 2: 2

## 2024-05-02 ASSESSMENT — LIFESTYLE VARIABLES
HOW MANY STANDARD DRINKS CONTAINING ALCOHOL DO YOU HAVE ON A TYPICAL DAY: PATIENT DOES NOT DRINK
SKIP TO QUESTIONS 9-10: 1
HOW OFTEN DO YOU HAVE SIX OR MORE DRINKS ON ONE OCCASION: NEVER
HOW OFTEN DO YOU HAVE A DRINK CONTAINING ALCOHOL: NEVER
AUDIT-C TOTAL SCORE: 0

## 2024-05-02 ASSESSMENT — ENCOUNTER SYMPTOMS
LOSS OF SENSATION IN FEET: 0
DEPRESSION: 1
OCCASIONAL FEELINGS OF UNSTEADINESS: 1

## 2024-05-02 ASSESSMENT — COLUMBIA-SUICIDE SEVERITY RATING SCALE - C-SSRS
2. HAVE YOU ACTUALLY HAD ANY THOUGHTS OF KILLING YOURSELF?: NO
6. HAVE YOU EVER DONE ANYTHING, STARTED TO DO ANYTHING, OR PREPARED TO DO ANYTHING TO END YOUR LIFE?: NO
1. IN THE PAST MONTH, HAVE YOU WISHED YOU WERE DEAD OR WISHED YOU COULD GO TO SLEEP AND NOT WAKE UP?: NO

## 2024-05-02 NOTE — PATIENT INSTRUCTIONS
2 right upper lobe nodules largest of which is 11 mm noted on CT chest, abdomen and pelvis dated 4/30/2024.  History of breast cancer.   Scheduled for follow up with Oncology 5/6/2024.    Recommend PET CT Chest   She will be notified of results as they become available.   Referred to Dr. Dixon as needed.          Lung Nodule Clinic    [facility], [address]  [place], [place] [address]  Phone [phone]  Fax [phone]  Nurse Coordinator [phone]                                          Welcome to the [facility]od[facility]    Today was the initial consult with the lung nodule clinic to determine proper recommendations for follow up. Your care is coordinated to ensure timely management.  As you know, early detection of cancer is very important.  Nodules that are large, look suspicious or have changed over time is why further evaluation such as the additional imaging test that we have ordered is needed. Our clinic will work closely with you in choosing the best next step.       What is my next step?  We will assist with scheduling scans, results reviews, and referrals for priority appointments.      Who do I call?  Your care coordinator for the lung nodule clinic can be contacted at [phone]  All scheduling needs can be assisted within the Cardiac Surgery/Thoracic Surgery/Lung Nodule Clinic offices at [phone].               Table  Finn H, Sandy DP, Cally ROGERS, et al. Guidelines for Management of Incidental Pulmonary Nodules Detected on CT Images: From the Fleischner Society 2017. Radiology 2017;284:228-243.

## 2024-05-02 NOTE — PROGRESS NOTES
Subjective   Patient ID: Adriana Holder is a 74 y.o. female who presents for New Patient Visit (Adriana has a new visit regarding a lung nodule.  Never used any tobacco products.  She has a history of breast cancer.  Sister had cancer of either uterine or ovarian. ).  HPI 74-year-old female presents today for lung nodule clinic.    Telephone conversation between Adriana Holder and Ritu Morris CNP at [facility] lung nodule clinic per patient request.    Never used any tobacco products.  She has a history of breast cancer.  Sister had cancer of either uterine or ovarian.    4/30/2024 CT Chest abdomen, pelvis with iv contrast   Mentioned in the body report but not the impression is the presence  of 2 right lung nodules. The patient has a history of breast cancer.  Further evaluation may be useful    There are 2 right upper lobe lung nodules.  The largest measures 11.  There is a right lower lobe bulla.     Review of Systems  Review of systems: Present-feeling well. Not present-chills, fatigue and fever.  Respiratory: Not present-difficulty breathing, cough, bloody sputum.  Cardiovascular: Not present-chest pain, palpitations, dyspnea on exertion.  Objective   There were no vitals taken for this visit.   Assessment/Plan   Diagnoses and all orders for this visit:  Lung nodules  -     NM PET CT lung CA initial diagnosis; Future  -     Referral to Pulmonology; Future  History of breast cancer  -     NM PET CT lung CA initial diagnosis; Future  Other orders  -     Referral to Lung Nodule Center

## 2024-05-09 ENCOUNTER — HOSPITAL ENCOUNTER (OUTPATIENT)
Dept: RADIOLOGY | Facility: HOSPITAL | Age: 74
Discharge: HOME | End: 2024-05-09
Payer: COMMERCIAL

## 2024-05-09 DIAGNOSIS — R11.2 NAUSEA WITH VOMITING, UNSPECIFIED: ICD-10-CM

## 2024-05-20 ENCOUNTER — APPOINTMENT (OUTPATIENT)
Dept: CARDIAC SURGERY | Facility: CLINIC | Age: 74
End: 2024-05-20
Payer: COMMERCIAL

## 2024-06-03 ENCOUNTER — OFFICE VISIT (OUTPATIENT)
Dept: CARDIAC SURGERY | Facility: CLINIC | Age: 74
End: 2024-06-03
Payer: COMMERCIAL

## 2024-06-03 VITALS
SYSTOLIC BLOOD PRESSURE: 131 MMHG | RESPIRATION RATE: 19 BRPM | WEIGHT: 172 LBS | OXYGEN SATURATION: 98 % | TEMPERATURE: 98 F | HEIGHT: 72 IN | BODY MASS INDEX: 23.3 KG/M2 | DIASTOLIC BLOOD PRESSURE: 84 MMHG | HEART RATE: 102 BPM

## 2024-06-03 DIAGNOSIS — I71.21 ANEURYSM OF ASCENDING AORTA WITHOUT RUPTURE (CMS-HCC): Primary | ICD-10-CM

## 2024-06-03 PROCEDURE — 1126F AMNT PAIN NOTED NONE PRSNT: CPT | Performed by: THORACIC SURGERY (CARDIOTHORACIC VASCULAR SURGERY)

## 2024-06-03 PROCEDURE — 1159F MED LIST DOCD IN RCRD: CPT | Performed by: THORACIC SURGERY (CARDIOTHORACIC VASCULAR SURGERY)

## 2024-06-03 PROCEDURE — 3075F SYST BP GE 130 - 139MM HG: CPT | Performed by: THORACIC SURGERY (CARDIOTHORACIC VASCULAR SURGERY)

## 2024-06-03 PROCEDURE — 3079F DIAST BP 80-89 MM HG: CPT | Performed by: THORACIC SURGERY (CARDIOTHORACIC VASCULAR SURGERY)

## 2024-06-03 PROCEDURE — 99215 OFFICE O/P EST HI 40 MIN: CPT | Performed by: THORACIC SURGERY (CARDIOTHORACIC VASCULAR SURGERY)

## 2024-06-03 PROCEDURE — 1036F TOBACCO NON-USER: CPT | Performed by: THORACIC SURGERY (CARDIOTHORACIC VASCULAR SURGERY)

## 2024-06-03 PROCEDURE — 99205 OFFICE O/P NEW HI 60 MIN: CPT | Performed by: THORACIC SURGERY (CARDIOTHORACIC VASCULAR SURGERY)

## 2024-06-03 RX ORDER — FLUTICASONE PROPIONATE 50 MCG
SPRAY, SUSPENSION (ML) NASAL
COMMUNITY
Start: 2024-05-20

## 2024-06-03 ASSESSMENT — ENCOUNTER SYMPTOMS
OCCASIONAL FEELINGS OF UNSTEADINESS: 1
DEPRESSION: 1
LOSS OF SENSATION IN FEET: 0

## 2024-06-03 ASSESSMENT — PAIN SCALES - GENERAL: PAINLEVEL: 0-NO PAIN

## 2024-06-03 NOTE — LETTER
Rima 3, 2024     Christopher Lopez DO  [address] [facility]  [facility] [address]    Patient: Adriana Holder   YOB: 1950   Date of Visit: 6/3/2024       Dear Dr. Christopher Lopez DO:    Thank you for referring Adriana Holder to me for evaluation. Below are my notes for this consultation.  If you have questions, please do not hesitate to call me. I look forward to following your patient along with you.       Sincerely,     Corey Miles MD      CC: MD Martell Garcia MD  ______________________________________________________________________________________    Chief Complaint  Fall    HPI:   Ms. Adriana Holder is an 74 y.o. female, who is a patient of Dr. Senthil Landers MD, in house.   I have been asked to see them by Christopher Luis to evaluate aortic aneurysm.  She was being evaluated for fall out of bed, and on her trauma work-up, she was found to have an ascending aortic aneurysm.  She denies syncope or pre-syncope. She was getting out of bed to go to the bathroom, and she had a mechanical fall.  She denies chest pain, orthopnea, dizziness or PND. She lives in assisted living, for about 5 years now. She is here in a wheelchair in the office, she has issues with balance.  She has atrial fibrillation since 2008, and is being treated with amiodarone, metoprolol and apixiban. She has had DCC twice, and no catheter based ablations.     Past Medical History:   Diagnosis Date   • Breast cancer (Multi)    • GERD (gastroesophageal reflux disease)    • Heart disease    • Hypertension        Past Surgical History:   Procedure Laterality Date   • BREAST LUMPECTOMY         Family History   Problem Relation Name Age of Onset   • Coronary artery disease Mother     • Heart attack Mother     • Cancer Sister     • Breast cancer Other         Social History     Socioeconomic History   • Marital status: Single     Spouse name: Not on file   • Number of children: Not on file   • Years of  education: Not on file   • Highest education level: Not on file   Occupational History   • Not on file   Tobacco Use   • Smoking status: Never   • Smokeless tobacco: Never   Substance and Sexual Activity   • Alcohol use: Never   • Drug use: Never   • Sexual activity: Not on file   Other Topics Concern   • Not on file   Social History Narrative   • Not on file     Social Determinants of Health     Financial Resource Strain: Not on file   Food Insecurity: No Food Insecurity (5/2/2024)    Hunger Vital Sign    • Worried About Running Out of Food in the Last Year: Never true    • Ran Out of Food in the Last Year: Never true   Transportation Needs: Not on file   Physical Activity: Not on file   Stress: Not on file   Social Connections: Not on file   Intimate Partner Violence: Not on file   Housing Stability: Not on file       Allergies   Allergen Reactions   • Ibuprofen Nausea/vomiting   • Tylenol [Acetaminophen] Unknown   • Codeine Unknown       Outpatient Encounter Medications as of 6/3/2024   Medication Sig Dispense Refill   • acetaminophen (Tylenol) 325 mg tablet Take 2 tablets (650 mg) by mouth every 8 hours if needed.     • alendronate (Fosamax) 70 mg tablet TAKE 1 TABLET ONCE A WEEK IN MORNING WITH GLASS OF WATER EMPTY STOMACH DONT LIE DOWN FOR 30 MINUTES     • amiodarone (Pacerone) 200 mg tablet Take 0.5 tablets (100 mg) by mouth once daily.     • ammonium lactate (Lac-Hydrin) 12 % lotion Apply and rub in a thin film to affected areas twice daily (am and pm).     • anastrozole (Arimidex) 1 mg tablet Take 1 tablet (1 mg total) by mouth once daily.     • apixaban (Eliquis) 5 mg tablet Take 1 tablet (5 mg) by mouth 2 times a day.     • atorvastatin (Lipitor) 10 mg tablet Take 1 tablet (10 mg) by mouth once daily at bedtime.     • BISACODYL ORAL Insert 1 suppository rectally once daily     • calcium carbonate-vitamin D3 (Oscal-500) 500 mg-10 mcg (400 unit) tablet Take 2 tablets by mouth once daily.     •  cholecalciferol (Vitamin D-3) 25 MCG (1000 UT) capsule Take 2 capsules (50 mcg) by mouth once daily.     • dextromethorphan-guaifenesin (Robitussin DM)  mg/5 mL oral liquid Take by mouth.     • digoxin (Lanoxin) 125 MCG tablet Take by mouth.     • DOCUSATE SODIUM ORAL Take by mouth.     • fluticasone (Flonase) 50 mcg/actuation nasal spray      • furosemide (Lasix) 20 mg tablet      • hydrocortisone 1 % cream Apply sparingly and rub in well to affected area (s) as directed.     • ketoconazole (NIZOral) 2 % shampoo Shampoo hair/scalp as directed     • lidocaine (Lidoderm) 5 % patch Place 1 patch over 12 hours on the skin once daily. Remove & discard patch within 12 hours or as directed by MD. 5 patch 0   • magnesium hydroxide (Milk of Magnesia) 400 mg/5 mL suspension Take 30 mL by mouth once daily.     • meclizine (Antivert) 25 mg tablet Take by mouth.     • metoprolol tartrate (Lopressor) 50 mg tablet      • midodrine (Proamatine) 5 mg tablet Take 1 tablet (5 mg) by mouth 3 times a day.     • omeprazole (PriLOSEC) 40 mg DR capsule Take 1 capsule (40 mg) by mouth 2 times a day. Do not crush or chew.     • ondansetron (Zofran) 4 mg tablet Take 4 mg by mouth every 8 hours as needed.     • polyethylene glycol (Glycolax, Miralax) 17 gram/dose powder Take by mouth once daily. Dissolve dose in 4 - 8 ounces of liquid and take as directed.     • potassium chloride CR 10 mEq ER tablet Take 1 tablet (10 mEq) by mouth once daily. Do not crush, chew, or split.     • [DISCONTINUED] calcium carbonate-vitamin D3 600 mg-5 mcg (200 unit) capsule Take 2 capsules by mouth once daily.     • [DISCONTINUED] albuterol 2.5 mg /3 mL (0.083 %) nebulizer solution      • [DISCONTINUED] diphenhydrAMINE (BENADryl) 25 mg capsule Take 1 capsule (25 mg) by mouth every 6 hours if needed.     • [DISCONTINUED] furosemide (Lasix) 40 mg tablet Take 0.5 tablets (20 mg) by mouth once daily.     • [DISCONTINUED] mirtazapine (Remeron) 15 mg tablet       • [DISCONTINUED] omeprazole OTC (PriLOSEC OTC) 20 mg EC tablet Take 1 tablet (20 mg) by mouth once daily. Do not crush, chew, or split.     • [DISCONTINUED] triamcinolone (Kenalog) 0.1 % cream Apply topically twice a day.       No facility-administered encounter medications on file as of 6/3/2024.       Physical Exam  Constitutional:       Appearance: She is ill-appearing.   HENT:      Head: Normocephalic and atraumatic.   Eyes:      General: No scleral icterus.     Pupils: Pupils are equal, round, and reactive to light.   Cardiovascular:      Rate and Rhythm: Rhythm irregular.      Pulses: Normal pulses.   Pulmonary:      Effort: Pulmonary effort is normal.   Abdominal:      General: Abdomen is flat.      Palpations: Abdomen is soft.   Musculoskeletal:      Cervical back: Normal range of motion.      Right lower leg: Edema present.      Left lower leg: Edema present.   Skin:     General: Skin is warm and dry.   Neurological:      General: No focal deficit present.      Mental Status: She is alert and oriented to person, place, and time.   Psychiatric:         Mood and Affect: Mood normal.         No results found for this or any previous visit (from the past 4464 hour(s)).    Lab Results   Component Value Date    WBC 6.9 04/30/2024    HGB 13.6 04/30/2024    HCT 41.7 04/30/2024    MCV 91 04/30/2024     04/30/2024     Lab Results   Component Value Date    GLUCOSE 123 (H) 04/30/2024    CALCIUM 9.7 04/30/2024     04/30/2024    K 4.1 04/30/2024    CO2 28 04/30/2024     04/30/2024    BUN 28 (H) 04/30/2024    CREATININE 1.18 (H) 04/30/2024     CTA C/A/P: dated 4/30/24:   I have personally seen and evaluated these images.    She has an ascending aortic aneurysm that is 44 mm in diameter.  She has right atrial enlargement.  The aortic arch branching pattern is normal.  There is no evidence of dissection or hematoma.       Assessment and Plan:   Ms. Adriana Holder is an 74 y.o. female who presents with  aortic aneurysm.  This is associated with HTN, HLD, longstanding persistent Atrial Fibrillation, HFpEF; and in the setting of moderate protein malnutrition, CKD stage III and breast cancer.  She has no complaints.  We discussed the natural history of aortic aneurysm disease, and the need for follow-up and surveillance. We discussed that hypertension is the leading cause of aneurysmal growth, and the importance of maintaining  blood pressure in a normal range. I discussed that our goal should be a systolic blood pressure of 145 mmHg or less, and a diastolic blood pressure of 80 mmHg or less. We also discussed cardiovascular activities that are permissible and in fact encouraged for weight loss, and the avoidance of heavy lifting and increases in intrathoracic pressure. We also discussed that current guidelines state a diameter of 55 mm is when we should discuss surgical intervention.  If she has growth of her aneurysm this may require surgical intervention.     We discussed her atrial fibrillation, she is content with her treatment, and is not interested in any surgical or catheter based interventions at this time.  She is meeting Dr. Dixon on the June 12th, to follow lung nodules, with her history of breast cancer.      We will plan for surveillance with a CT in October, 2024, with a follow visit afterwards to go over results.

## 2024-06-03 NOTE — PROGRESS NOTES
Chief Complaint  Fall    HPI:   Ms. Adriana Holder is an 74 y.o. female, who is a patient of Dr. Senthil Landers MD, in house.   I have been asked to see them by Christopher Luis to evaluate aortic aneurysm.  She was being evaluated for fall out of bed, and on her trauma work-up, she was found to have an ascending aortic aneurysm.  She denies syncope or pre-syncope. She was getting out of bed to go to the bathroom, and she had a mechanical fall.  She denies chest pain, orthopnea, dizziness or PND. She lives in assisted living, for about 5 years now. She is here in a wheelchair in the office, she has issues with balance.  She has atrial fibrillation since 2008, and is being treated with amiodarone, metoprolol and apixiban. She has had DCC twice, and no catheter based ablations.     Past Medical History:   Diagnosis Date    Breast cancer (Multi)     GERD (gastroesophageal reflux disease)     Heart disease     Hypertension        Past Surgical History:   Procedure Laterality Date    BREAST LUMPECTOMY         Family History   Problem Relation Name Age of Onset    Coronary artery disease Mother      Heart attack Mother      Cancer Sister      Breast cancer Other         Social History     Socioeconomic History    Marital status: Single     Spouse name: Not on file    Number of children: Not on file    Years of education: Not on file    Highest education level: Not on file   Occupational History    Not on file   Tobacco Use    Smoking status: Never    Smokeless tobacco: Never   Substance and Sexual Activity    Alcohol use: Never    Drug use: Never    Sexual activity: Not on file   Other Topics Concern    Not on file   Social History Narrative    Not on file     Social Determinants of Health     Financial Resource Strain: Not on file   Food Insecurity: No Food Insecurity (5/2/2024)    Hunger Vital Sign     Worried About Running Out of Food in the Last Year: Never true     Ran Out of Food in the Last Year: Never true    Transportation Needs: Not on file   Physical Activity: Not on file   Stress: Not on file   Social Connections: Not on file   Intimate Partner Violence: Not on file   Housing Stability: Not on file       Allergies   Allergen Reactions    Ibuprofen Nausea/vomiting    Tylenol [Acetaminophen] Unknown    Codeine Unknown       Outpatient Encounter Medications as of 6/3/2024   Medication Sig Dispense Refill    acetaminophen (Tylenol) 325 mg tablet Take 2 tablets (650 mg) by mouth every 8 hours if needed.      alendronate (Fosamax) 70 mg tablet TAKE 1 TABLET ONCE A WEEK IN MORNING WITH GLASS OF WATER EMPTY STOMACH DONT LIE DOWN FOR 30 MINUTES      amiodarone (Pacerone) 200 mg tablet Take 0.5 tablets (100 mg) by mouth once daily.      ammonium lactate (Lac-Hydrin) 12 % lotion Apply and rub in a thin film to affected areas twice daily (am and pm).      anastrozole (Arimidex) 1 mg tablet Take 1 tablet (1 mg total) by mouth once daily.      apixaban (Eliquis) 5 mg tablet Take 1 tablet (5 mg) by mouth 2 times a day.      atorvastatin (Lipitor) 10 mg tablet Take 1 tablet (10 mg) by mouth once daily at bedtime.      BISACODYL ORAL Insert 1 suppository rectally once daily      calcium carbonate-vitamin D3 (Oscal-500) 500 mg-10 mcg (400 unit) tablet Take 2 tablets by mouth once daily.      cholecalciferol (Vitamin D-3) 25 MCG (1000 UT) capsule Take 2 capsules (50 mcg) by mouth once daily.      dextromethorphan-guaifenesin (Robitussin DM)  mg/5 mL oral liquid Take by mouth.      digoxin (Lanoxin) 125 MCG tablet Take by mouth.      DOCUSATE SODIUM ORAL Take by mouth.      fluticasone (Flonase) 50 mcg/actuation nasal spray       furosemide (Lasix) 20 mg tablet       hydrocortisone 1 % cream Apply sparingly and rub in well to affected area (s) as directed.      ketoconazole (NIZOral) 2 % shampoo Shampoo hair/scalp as directed      lidocaine (Lidoderm) 5 % patch Place 1 patch over 12 hours on the skin once daily. Remove & discard  patch within 12 hours or as directed by MD. 5 patch 0    magnesium hydroxide (Milk of Magnesia) 400 mg/5 mL suspension Take 30 mL by mouth once daily.      meclizine (Antivert) 25 mg tablet Take by mouth.      metoprolol tartrate (Lopressor) 50 mg tablet       midodrine (Proamatine) 5 mg tablet Take 1 tablet (5 mg) by mouth 3 times a day.      omeprazole (PriLOSEC) 40 mg DR capsule Take 1 capsule (40 mg) by mouth 2 times a day. Do not crush or chew.      ondansetron (Zofran) 4 mg tablet Take 4 mg by mouth every 8 hours as needed.      polyethylene glycol (Glycolax, Miralax) 17 gram/dose powder Take by mouth once daily. Dissolve dose in 4 - 8 ounces of liquid and take as directed.      potassium chloride CR 10 mEq ER tablet Take 1 tablet (10 mEq) by mouth once daily. Do not crush, chew, or split.      [DISCONTINUED] calcium carbonate-vitamin D3 600 mg-5 mcg (200 unit) capsule Take 2 capsules by mouth once daily.      [DISCONTINUED] albuterol 2.5 mg /3 mL (0.083 %) nebulizer solution       [DISCONTINUED] diphenhydrAMINE (BENADryl) 25 mg capsule Take 1 capsule (25 mg) by mouth every 6 hours if needed.      [DISCONTINUED] furosemide (Lasix) 40 mg tablet Take 0.5 tablets (20 mg) by mouth once daily.      [DISCONTINUED] mirtazapine (Remeron) 15 mg tablet       [DISCONTINUED] omeprazole OTC (PriLOSEC OTC) 20 mg EC tablet Take 1 tablet (20 mg) by mouth once daily. Do not crush, chew, or split.      [DISCONTINUED] triamcinolone (Kenalog) 0.1 % cream Apply topically twice a day.       No facility-administered encounter medications on file as of 6/3/2024.       Physical Exam  Constitutional:       Appearance: She is ill-appearing.   HENT:      Head: Normocephalic and atraumatic.   Eyes:      General: No scleral icterus.     Pupils: Pupils are equal, round, and reactive to light.   Cardiovascular:      Rate and Rhythm: Rhythm irregular.      Pulses: Normal pulses.   Pulmonary:      Effort: Pulmonary effort is normal.    Abdominal:      General: Abdomen is flat.      Palpations: Abdomen is soft.   Musculoskeletal:      Cervical back: Normal range of motion.      Right lower leg: Edema present.      Left lower leg: Edema present.   Skin:     General: Skin is warm and dry.   Neurological:      General: No focal deficit present.      Mental Status: She is alert and oriented to person, place, and time.   Psychiatric:         Mood and Affect: Mood normal.         No results found for this or any previous visit (from the past 4464 hour(s)).    Lab Results   Component Value Date    WBC 6.9 04/30/2024    HGB 13.6 04/30/2024    HCT 41.7 04/30/2024    MCV 91 04/30/2024     04/30/2024     Lab Results   Component Value Date    GLUCOSE 123 (H) 04/30/2024    CALCIUM 9.7 04/30/2024     04/30/2024    K 4.1 04/30/2024    CO2 28 04/30/2024     04/30/2024    BUN 28 (H) 04/30/2024    CREATININE 1.18 (H) 04/30/2024     CTA C/A/P: dated 4/30/24:   I have personally seen and evaluated these images.    She has an ascending aortic aneurysm that is 44 mm in diameter.  She has right atrial enlargement.  The aortic arch branching pattern is normal.  There is no evidence of dissection or hematoma.       Assessment and Plan:   Ms. Adriana Holder is an 74 y.o. female who presents with aortic aneurysm.  This is associated with HTN, HLD, longstanding persistent Atrial Fibrillation, HFpEF; and in the setting of moderate protein malnutrition, CKD stage III and breast cancer.  She has no complaints.  We discussed the natural history of aortic aneurysm disease, and the need for follow-up and surveillance. We discussed that hypertension is the leading cause of aneurysmal growth, and the importance of maintaining  blood pressure in a normal range. I discussed that our goal should be a systolic blood pressure of 145 mmHg or less, and a diastolic blood pressure of 80 mmHg or less. We also discussed cardiovascular activities that are permissible and in  fact encouraged for weight loss, and the avoidance of heavy lifting and increases in intrathoracic pressure. We also discussed that current guidelines state a diameter of 55 mm is when we should discuss surgical intervention.  If she has growth of her aneurysm this may require surgical intervention.     We discussed her atrial fibrillation, she is content with her treatment, and is not interested in any surgical or catheter based interventions at this time.  She is meeting Dr. Dixon on the June 12th, to follow lung nodules, with her history of breast cancer.      We will plan for surveillance with a CT in October, 2024, with a follow visit afterwards to go over results.

## 2024-06-07 NOTE — PROGRESS NOTES
Subjective   Adriana Holder  is a 74 y.o. female who presents for evaluation of lung nodules.    This patient presented after an unwitnessed fall, rolling out of bed.  Her workup included radiographic imaging which included a CT scan of the chest abdomen and pelvis on 4/30/2024.  That scan demonstrated 2 right upper lobe lung nodules the largest of which measured 11 mm.    Currently the patient is in their usual state of health. She denies the following symptoms: chest pain, shortness of breath at rest, shortness of breath with activity, cough, hemoptysis, fevers, chills, and weight loss.      Medical history is notable for no history of MI, CVA or other major cardiovascular disease.   Cancer history: Right breast invasive ductal carcinoma (ER/IN+)[id number]; Stage 1B dx'd 12/12/2019   Treatment Summary:  - Neoadjuvant arimidex: 1/2020-7/2020  - R lumpectomy (Dr Jeffries, 7/2/2020): y pT2 p N0  - Radiation:  09/14/2020-10/02/2020 (Dr Zaidi)  - Current treatment: arimidex Restarted ~11/2020     She  reports that she has never smoked. She has never used smokeless tobacco. She reports that she does not drink alcohol and does not use drugs.    Objective   Physical Exam  The patient is well-appearing and in no acute distress. The trachea is midline and there is no crepitus. The lungs were clear to auscultation grossly. There was good effort and excursion. The heart had a regular rate and rhythm. The abdomen was soft, nontender and nondistended. The extremities had no edema or gross deformities. Mood and affect are appropriate.  Diagnostic Studies  I have reviewed a CT which shows a right upper lobe lung nodule    Assessment/Plan   Overall, I believe that the patient has a lung nodule of unclear etiology.     Based on the patient's clinical presentation and available radiographic imaging, The etiology of the nodule is unclear.  I estimate the likelihood of malignancy as moderate.  This 11 mm nodule may represent a primary  lung cancer or perhaps recurrence of her breast cancer.  We discussed various management strategies including surgery, biopsy, and observation.  Based on this discussion, the patient elected for PET CT - if PET is reassuring, then CT in 3 months. If PET is concerning, then CT biopsy and phone call to discuss results.     I recommend PET CT    I discussed this in detail with the patient, including a discussion of alternatives. They were comfortable with this approach.     Martell Dixon MD  [phone]

## 2024-06-12 ENCOUNTER — OFFICE VISIT (OUTPATIENT)
Dept: SURGERY | Facility: CLINIC | Age: 74
End: 2024-06-12
Payer: COMMERCIAL

## 2024-06-12 VITALS
BODY MASS INDEX: 23.3 KG/M2 | OXYGEN SATURATION: 95 % | HEIGHT: 72 IN | DIASTOLIC BLOOD PRESSURE: 89 MMHG | TEMPERATURE: 97.7 F | RESPIRATION RATE: 20 BRPM | HEART RATE: 103 BPM | WEIGHT: 172 LBS | SYSTOLIC BLOOD PRESSURE: 131 MMHG

## 2024-06-12 DIAGNOSIS — R91.8 LUNG NODULES: ICD-10-CM

## 2024-06-12 DIAGNOSIS — Z85.3 HISTORY OF BREAST CANCER: ICD-10-CM

## 2024-06-12 PROCEDURE — 99205 OFFICE O/P NEW HI 60 MIN: CPT | Performed by: THORACIC SURGERY (CARDIOTHORACIC VASCULAR SURGERY)

## 2024-06-12 PROCEDURE — 3075F SYST BP GE 130 - 139MM HG: CPT | Performed by: THORACIC SURGERY (CARDIOTHORACIC VASCULAR SURGERY)

## 2024-06-12 PROCEDURE — 1036F TOBACCO NON-USER: CPT | Performed by: THORACIC SURGERY (CARDIOTHORACIC VASCULAR SURGERY)

## 2024-06-12 PROCEDURE — 99215 OFFICE O/P EST HI 40 MIN: CPT | Performed by: THORACIC SURGERY (CARDIOTHORACIC VASCULAR SURGERY)

## 2024-06-12 PROCEDURE — 1126F AMNT PAIN NOTED NONE PRSNT: CPT | Performed by: THORACIC SURGERY (CARDIOTHORACIC VASCULAR SURGERY)

## 2024-06-12 PROCEDURE — 1159F MED LIST DOCD IN RCRD: CPT | Performed by: THORACIC SURGERY (CARDIOTHORACIC VASCULAR SURGERY)

## 2024-06-12 PROCEDURE — 3079F DIAST BP 80-89 MM HG: CPT | Performed by: THORACIC SURGERY (CARDIOTHORACIC VASCULAR SURGERY)

## 2024-06-12 ASSESSMENT — PAIN SCALES - GENERAL: PAINLEVEL: 0-NO PAIN

## 2024-06-12 ASSESSMENT — ENCOUNTER SYMPTOMS
OCCASIONAL FEELINGS OF UNSTEADINESS: 1
DEPRESSION: 0
LOSS OF SENSATION IN FEET: 1

## 2024-06-13 DIAGNOSIS — R91.1 INCIDENTAL LUNG NODULE, GREATER THAN OR EQUAL TO 8MM: Primary | ICD-10-CM

## 2024-06-13 DIAGNOSIS — D38.6 NEOPLASM OF UNCERTAIN BEHAVIOR OF RESPIRATORY ORGAN, UNSPECIFIED: ICD-10-CM

## 2024-06-27 ENCOUNTER — HOSPITAL ENCOUNTER (OUTPATIENT)
Dept: RADIOLOGY | Facility: CLINIC | Age: 74
Discharge: HOME | End: 2024-06-27
Payer: COMMERCIAL

## 2024-06-27 DIAGNOSIS — R91.1 INCIDENTAL LUNG NODULE, GREATER THAN OR EQUAL TO 8MM: ICD-10-CM

## 2024-06-27 DIAGNOSIS — D38.6 NEOPLASM OF UNCERTAIN BEHAVIOR OF RESPIRATORY ORGAN, UNSPECIFIED: ICD-10-CM

## 2024-06-27 LAB — GLUCOSE BLD MANUAL STRIP-MCNC: 108 MG/DL (ref 74–99)

## 2024-06-27 PROCEDURE — A9552 F18 FDG: HCPCS | Performed by: THORACIC SURGERY (CARDIOTHORACIC VASCULAR SURGERY)

## 2024-06-27 PROCEDURE — 82947 ASSAY GLUCOSE BLOOD QUANT: CPT

## 2024-06-27 PROCEDURE — 78815 PET IMAGE W/CT SKULL-THIGH: CPT | Mod: PI

## 2024-06-27 PROCEDURE — 3430000001 HC RX 343 DIAGNOSTIC RADIOPHARMACEUTICALS: Performed by: THORACIC SURGERY (CARDIOTHORACIC VASCULAR SURGERY)

## 2024-06-27 RX ORDER — FLUDEOXYGLUCOSE F 18 200 MCI/ML
14.96 INJECTION, SOLUTION INTRAVENOUS
Status: COMPLETED | OUTPATIENT
Start: 2024-06-27 | End: 2024-06-27

## 2024-07-02 ENCOUNTER — LAB REQUISITION (OUTPATIENT)
Dept: LAB | Facility: HOSPITAL | Age: 74
End: 2024-07-02
Payer: COMMERCIAL

## 2024-07-02 DIAGNOSIS — N18.9 CHRONIC KIDNEY DISEASE, UNSPECIFIED: ICD-10-CM

## 2024-07-02 DIAGNOSIS — I50.9 HEART FAILURE, UNSPECIFIED (MULTI): ICD-10-CM

## 2024-07-02 LAB
ALBUMIN SERPL BCP-MCNC: 4.1 G/DL (ref 3.4–5)
ALP SERPL-CCNC: 77 U/L (ref 33–136)
ALT SERPL W P-5'-P-CCNC: 18 U/L (ref 7–45)
ANION GAP SERPL CALC-SCNC: 12 MMOL/L (ref 10–20)
AST SERPL W P-5'-P-CCNC: 19 U/L (ref 9–39)
BILIRUB SERPL-MCNC: 0.9 MG/DL (ref 0–1.2)
BUN SERPL-MCNC: 28 MG/DL (ref 6–23)
CALCIUM SERPL-MCNC: 9.1 MG/DL (ref 8.6–10.3)
CHLORIDE SERPL-SCNC: 104 MMOL/L (ref 98–107)
CO2 SERPL-SCNC: 28 MMOL/L (ref 21–32)
CREAT SERPL-MCNC: 1.11 MG/DL (ref 0.5–1.05)
EGFRCR SERPLBLD CKD-EPI 2021: 52 ML/MIN/1.73M*2
ERYTHROCYTE [DISTWIDTH] IN BLOOD BY AUTOMATED COUNT: 13.1 % (ref 11.5–14.5)
GLUCOSE SERPL-MCNC: 95 MG/DL (ref 74–99)
HCT VFR BLD AUTO: 38.9 % (ref 36–46)
HGB BLD-MCNC: 12.9 G/DL (ref 12–16)
HOLD SPECIMEN: NORMAL
MCH RBC QN AUTO: 30.9 PG (ref 26–34)
MCHC RBC AUTO-ENTMCNC: 33.2 G/DL (ref 32–36)
MCV RBC AUTO: 93 FL (ref 80–100)
NRBC BLD-RTO: 0 /100 WBCS (ref 0–0)
PLATELET # BLD AUTO: 215 X10*3/UL (ref 150–450)
POTASSIUM SERPL-SCNC: 3.9 MMOL/L (ref 3.5–5.3)
PROT SERPL-MCNC: 7.1 G/DL (ref 6.4–8.2)
RBC # BLD AUTO: 4.17 X10*6/UL (ref 4–5.2)
SODIUM SERPL-SCNC: 140 MMOL/L (ref 136–145)
WBC # BLD AUTO: 7.8 X10*3/UL (ref 4.4–11.3)

## 2024-07-02 PROCEDURE — 85027 COMPLETE CBC AUTOMATED: CPT | Mod: OUT | Performed by: INTERNAL MEDICINE

## 2024-07-02 PROCEDURE — 80053 COMPREHEN METABOLIC PANEL: CPT | Mod: OUT | Performed by: INTERNAL MEDICINE

## 2024-07-03 DIAGNOSIS — R91.1 INCIDENTAL LUNG NODULE, GREATER THAN OR EQUAL TO 8MM: Primary | ICD-10-CM

## 2024-08-06 ENCOUNTER — HOSPITAL ENCOUNTER (OUTPATIENT)
Dept: RADIOLOGY | Facility: HOSPITAL | Age: 74
Discharge: HOME | End: 2024-08-06
Payer: COMMERCIAL

## 2024-08-06 VITALS
DIASTOLIC BLOOD PRESSURE: 81 MMHG | HEART RATE: 115 BPM | HEIGHT: 72 IN | BODY MASS INDEX: 23.29 KG/M2 | OXYGEN SATURATION: 96 % | SYSTOLIC BLOOD PRESSURE: 147 MMHG | TEMPERATURE: 98.2 F | RESPIRATION RATE: 18 BRPM | WEIGHT: 171.96 LBS

## 2024-08-06 DIAGNOSIS — R91.1 INCIDENTAL LUNG NODULE, GREATER THAN OR EQUAL TO 8MM: ICD-10-CM

## 2024-08-06 LAB
INR PPP: 1.2 (ref 0.9–1.1)
PLATELET # BLD AUTO: 215 X10*3/UL (ref 150–450)
PROTHROMBIN TIME: 13.7 SECONDS (ref 9.8–12.8)

## 2024-08-06 PROCEDURE — 2500000005 HC RX 250 GENERAL PHARMACY W/O HCPCS: Performed by: RADIOLOGY

## 2024-08-06 PROCEDURE — 32408 CORE NDL BX LNG/MED PERQ: CPT | Performed by: RADIOLOGY

## 2024-08-06 PROCEDURE — 36415 COLL VENOUS BLD VENIPUNCTURE: CPT | Performed by: RADIOLOGY

## 2024-08-06 PROCEDURE — 85610 PROTHROMBIN TIME: CPT | Performed by: RADIOLOGY

## 2024-08-06 PROCEDURE — 71045 X-RAY EXAM CHEST 1 VIEW: CPT

## 2024-08-06 PROCEDURE — 2720000007 HC OR 272 NO HCPCS

## 2024-08-06 PROCEDURE — 32408 CORE NDL BX LNG/MED PERQ: CPT

## 2024-08-06 PROCEDURE — 99153 MOD SED SAME PHYS/QHP EA: CPT | Performed by: RADIOLOGY

## 2024-08-06 PROCEDURE — 7100000009 HC PHASE TWO TIME - INITIAL BASE CHARGE

## 2024-08-06 PROCEDURE — 2500000004 HC RX 250 GENERAL PHARMACY W/ HCPCS (ALT 636 FOR OP/ED): Performed by: RADIOLOGY

## 2024-08-06 PROCEDURE — 85049 AUTOMATED PLATELET COUNT: CPT | Performed by: RADIOLOGY

## 2024-08-06 PROCEDURE — 7100000010 HC PHASE TWO TIME - EACH INCREMENTAL 1 MINUTE

## 2024-08-06 PROCEDURE — 99152 MOD SED SAME PHYS/QHP 5/>YRS: CPT | Performed by: RADIOLOGY

## 2024-08-06 PROCEDURE — 99153 MOD SED SAME PHYS/QHP EA: CPT

## 2024-08-06 PROCEDURE — 99152 MOD SED SAME PHYS/QHP 5/>YRS: CPT

## 2024-08-06 RX ORDER — SODIUM CHLORIDE 9 MG/ML
INJECTION, SOLUTION INTRAVENOUS CONTINUOUS PRN
Status: COMPLETED | OUTPATIENT
Start: 2024-08-06 | End: 2024-08-06

## 2024-08-06 RX ORDER — MIDAZOLAM HYDROCHLORIDE 1 MG/ML
INJECTION, SOLUTION INTRAMUSCULAR; INTRAVENOUS
Status: COMPLETED | OUTPATIENT
Start: 2024-08-06 | End: 2024-08-06

## 2024-08-06 RX ORDER — LIDOCAINE HYDROCHLORIDE 10 MG/ML
INJECTION, SOLUTION EPIDURAL; INFILTRATION; INTRACAUDAL; PERINEURAL
Status: COMPLETED | OUTPATIENT
Start: 2024-08-06 | End: 2024-08-06

## 2024-08-06 ASSESSMENT — PAIN - FUNCTIONAL ASSESSMENT
PAIN_FUNCTIONAL_ASSESSMENT: 0-10

## 2024-08-06 ASSESSMENT — PAIN SCALES - GENERAL
PAINLEVEL_OUTOF10: 0 - NO PAIN

## 2024-08-06 NOTE — PROCEDURES
Interventional Radiology Brief Postprocedure Note    Attending: Gail Cardoso MD    Assistant:   Staff Role   Fritz Little Radiology Technologist   Gail Cardoso MD Radiologist   Yudi Metcalf, RN Radiology Nurse       Diagnosis:   1. Incidental lung nodule, greater than or equal to 8mm  CT guided percutaneous biopsy lung    CT guided percutaneous biopsy lung          Description of procedure: CT guided percutaneous biopsy lung RUL 20 G x 2    Timeout:  Yes    Procedure Area: Procedure Area     Anesthesia:   Conscious Sedation    Complications: None    Estimated Blood Loss: minimal    Medications (Filter: Administrations occurring from 0924 to 1002 on 08/06/24) As of 08/06/24 1002      sodium chloride 0.9% infusion (mL/hr) Total volume:  Not documented*   *Total volume has not been documented. View each administration to see the amount administered.     Date/Time Rate/Dose/Volume Action       08/06/24  0925 50 mL/hr New Bag      1001  Stopped               oxygen (O2) therapy (L/min) Total volume:  Not documented* Dosing weight:  78   *Total volume has not been documented. View each administration to see the amount administered.     Date/Time Rate/Dose/Volume Action       08/06/24  0924 3 L/min Start      0956 5 L/min Start               midazolam (Versed) injection (mg) Total dose:  1 mg      Date/Time Rate/Dose/Volume Action       08/06/24  0933 1 mg Given               lidocaine PF (Xylocaine) 10 mg/mL (1 %) injection (mL) Total volume:  5 mL      Date/Time Rate/Dose/Volume Action       08/06/24  0945 5 mL Given                     See detailed result report with images in PACS.    The patient tolerated the procedure well without incident or complication and is in stable condition.

## 2024-08-06 NOTE — Clinical Note
Bandaid placed to right chest biopsy site, slight hematoma but no bleeding noted, procedure complete

## 2024-08-06 NOTE — NURSING NOTE
Bernabe with [facility] notified of pt discharge time around 13:30 post CXR reading.     13:40 - Bernabe Bowie notified of patient ready for DC. Per Bernabe - will  patient around 14:15.    14:28 - Bernabe Bowie here to pickup patient. Report left on confidential voicemail at [place].

## 2024-08-06 NOTE — PRE-PROCEDURE NOTE
Interventional Radiology Preprocedure Note    Indication for procedure: The encounter diagnosis was Incidental lung nodule, greater than or equal to 8mm.    Relevant review of systems: NA    Relevant Labs:   Lab Results   Component Value Date    CREATININE 1.11 (H) 07/02/2024    EGFR 52 (L) 07/02/2024    INR 1.7 (H) 04/30/2024    PROTIME 18.8 (H) 04/30/2024       Planned Sedation/Anesthesia: Moderate    Airway assessment: normal    Directed physical examination:    Aox3  No increased work of breathing.   No acute distress      Mallampati: II (hard and soft palate, upper portion of tonsils and uvula visible)    ASA Score: ASA 2 - Patient with mild systemic disease with no functional limitations    Benefits, risks and alternatives of procedure and planned sedation have been discussed with the patient and/or their representative. All questions answered and they agree to proceed.

## 2024-08-13 LAB
LAB AP ASR DISCLAIMER: NORMAL
LABORATORY COMMENT REPORT: NORMAL
PATH REPORT.FINAL DX SPEC: NORMAL
PATH REPORT.GROSS SPEC: NORMAL
PATH REPORT.RELEVANT HX SPEC: NORMAL
PATH REPORT.TOTAL CANCER: NORMAL
RESIDENT REVIEW: NORMAL

## 2024-08-19 NOTE — PROGRESS NOTES
Subjective   Adriana Holder  is a 74 y.o. female who presents for follow after a CT biopsy of the right upper lobe lung nodule on 8/6/24.  Pathology showed Adenocarcinoma, consistent with metastasis from the patient's known previous breast primary.      This patient presented after an unwitnessed fall, rolling out of bed.  Her workup included radiographic imaging which included a CT scan of the chest abdomen and pelvis on 4/30/2024.  That scan demonstrated 2 right upper lobe lung nodules the largest of which measured 11 mm.  I recommended a CT-guided biopsy.  This was performed on 8/6/2024 and was consistent with metastatic breast cancer.    Currently the patient is in their usual state of health.     She  reports that she has never smoked. She has never used smokeless tobacco. She reports that she does not drink alcohol and does not use drugs.    Objective   Physical Exam  Phone visit  Diagnostic Studies  I have reviewed a PET and biopsy results which suggest metastatic breast cancer    Assessment/Plan   Overall, I believe that the patient has a diagnosis of breast cancer metastasis to lung .     In the setting of metastatic breast cancer, I believe it is appropriate to offer the patient referral to medical oncology to determine the best course of treatment.  I believe options for treatment include systemic therapy, radiation treatment to her metastatic disease and/or surgical resection.  I believe the patient is scheduled to see her oncologist on Monday.  We have faxed all available reports to their office for consideration.  We will follow-up to determine if surgical resection is appropriate.  If it is, I would asked that pulmonary function testing be performed and the patient present for an in person visit to determine if she is clinically suitable for surgical resection.    I recommend referral to medical oncology to determine if surgical resection is appropriate for metastatic breast cancer in the setting    I  discussed this in detail with the patient, including a discussion of alternatives. They were comfortable with this approach.     Martell Dixon MD  [phone]    Time: 5 minutes

## 2024-08-21 LAB
LAB AP ASR DISCLAIMER: NORMAL
LABORATORY COMMENT REPORT: NORMAL
PATH REPORT.ADDENDUM SPEC: NORMAL
PATH REPORT.FINAL DX SPEC: NORMAL
PATH REPORT.GROSS SPEC: NORMAL
PATH REPORT.RELEVANT HX SPEC: NORMAL
PATH REPORT.TOTAL CANCER: NORMAL
RESIDENT REVIEW: NORMAL

## 2024-08-22 ENCOUNTER — TELEMEDICINE (OUTPATIENT)
Dept: SURGERY | Facility: HOSPITAL | Age: 74
End: 2024-08-22
Payer: COMMERCIAL

## 2024-08-22 DIAGNOSIS — C78.00 CARCINOMA OF RIGHT BREAST METASTATIC TO LUNG (MULTI): Primary | ICD-10-CM

## 2024-08-22 DIAGNOSIS — C50.911 CARCINOMA OF RIGHT BREAST METASTATIC TO LUNG (MULTI): Primary | ICD-10-CM

## 2024-08-22 PROCEDURE — 99441 PR PHYS/QHP TELEPHONE EVALUATION 5-10 MIN: CPT | Performed by: THORACIC SURGERY (CARDIOTHORACIC VASCULAR SURGERY)

## 2024-09-09 ENCOUNTER — HOSPITAL ENCOUNTER (OUTPATIENT)
Dept: RESPIRATORY THERAPY | Facility: HOSPITAL | Age: 74
Discharge: HOME | End: 2024-09-09
Payer: COMMERCIAL

## 2024-09-09 DIAGNOSIS — C50.911 CARCINOMA OF RIGHT BREAST METASTATIC TO LUNG (MULTI): ICD-10-CM

## 2024-09-09 DIAGNOSIS — C78.00 CARCINOMA OF RIGHT BREAST METASTATIC TO LUNG (MULTI): ICD-10-CM

## 2024-09-09 PROCEDURE — 94010 BREATHING CAPACITY TEST: CPT

## 2024-09-09 NOTE — PROGRESS NOTES
Subjective   Adriana Holder  is a 74 y.o. female who presents for evaluation of a CT biopsy of the right upper lobe lung nodule done on 8/6/24.  Pathology showed Adenocarcinoma, consistent with metastasis from the patient's known previous breast primary.       This patient presented after an unwitnessed fall, rolling out of bed.  Her workup included radiographic imaging which included a CT scan of the chest abdomen and pelvis on 4/30/2024.  That scan demonstrated 2 right upper lobe lung nodules the largest of which measured 11 mm.  I recommended a CT-guided biopsy.  This was performed on 8/6/2024 and was consistent with metastatic breast cancer. PFTs were completed on 9/9/24 -FEV1 52% predicted.  The patient saw her oncologist (Dr. Tomlinson) who felt that SBRT and surgical resection are reasonable options.    She  has a past medical history of Breast cancer (Multi), GERD (gastroesophageal reflux disease), Heart disease, and Hypertension.    She  reports that she has never smoked. She has never used smokeless tobacco. She reports that she does not drink alcohol and does not use drugs.    Objective   Physical Exam  The patient is frail-appearing and in no acute distress. The trachea is midline and there is no crepitus. The lungs were clear to auscultation grossly. There was good effort and excursion. The heart had a regular rate and rhythm. The abdomen was soft, nontender and nondistended. The extremities had no edema or gross deformities. Mood and affect are appropriate.  Diagnostic Studies  I reviewed the test results described in the HPI, specifically the PFTs, biopsy, and documentation from the Medical oncologist    Assessment/Plan   I believe that the patient has a diagnosis of metastatic breast cancer .     This patient has a small peripheral metastatic breast cancer.  I believe reasonable treatment strategies include stereotactic radiation and surgical resection. In essence, I concur with the medical oncology  perspective.  I carefully discussed the risks, benefits, and alternatives to surgery.  In my mind surgery is likely more effective than radiation treatment, but has significant elevated risk associated with treatment.  In this patient's case her frailty, comorbidities, and decreased pulmonary function testing suggest she is higher than typical risk.  Based on this discussion, the patient and family decided they would prefer nonsurgical treatment.  This treatment will be arranged by her medical oncologist.  Certainly she is welcome to contact my office to be seen on an as-needed basis    I recommend  SBRT treatment of metastatic cancer by others. Follow-up PRN.      I discussed this in detail with the patient, including a discussion of alternatives. They were comfortable with this approach.     Martell Dixon MD  [phone]    Addendum: The patient would like a referral for stereotactic radiation at [facility].  I recommend she be referred to Dr. John Dallas

## 2024-09-11 ENCOUNTER — OFFICE VISIT (OUTPATIENT)
Dept: SURGERY | Facility: CLINIC | Age: 74
End: 2024-09-11
Payer: COMMERCIAL

## 2024-09-11 VITALS
DIASTOLIC BLOOD PRESSURE: 98 MMHG | SYSTOLIC BLOOD PRESSURE: 135 MMHG | TEMPERATURE: 98.6 F | OXYGEN SATURATION: 95 % | HEART RATE: 114 BPM

## 2024-09-11 DIAGNOSIS — C78.00 CARCINOMA OF RIGHT BREAST METASTATIC TO LUNG (MULTI): Primary | ICD-10-CM

## 2024-09-11 DIAGNOSIS — C50.911 CARCINOMA OF RIGHT BREAST METASTATIC TO LUNG (MULTI): Primary | ICD-10-CM

## 2024-09-11 LAB
MGC ASCENT PFT - FEV1 - PRE: 1.39
MGC ASCENT PFT - FEV1 - PREDICTED: 2.66
MGC ASCENT PFT - FVC - PRE: 1.85
MGC ASCENT PFT - FVC - PREDICTED: 3.57

## 2024-09-11 PROCEDURE — 99215 OFFICE O/P EST HI 40 MIN: CPT | Performed by: THORACIC SURGERY (CARDIOTHORACIC VASCULAR SURGERY)

## 2024-09-11 PROCEDURE — 1159F MED LIST DOCD IN RCRD: CPT | Performed by: THORACIC SURGERY (CARDIOTHORACIC VASCULAR SURGERY)

## 2024-09-11 PROCEDURE — 3080F DIAST BP >= 90 MM HG: CPT | Performed by: THORACIC SURGERY (CARDIOTHORACIC VASCULAR SURGERY)

## 2024-09-11 PROCEDURE — 3075F SYST BP GE 130 - 139MM HG: CPT | Performed by: THORACIC SURGERY (CARDIOTHORACIC VASCULAR SURGERY)

## 2024-09-11 PROCEDURE — 1126F AMNT PAIN NOTED NONE PRSNT: CPT | Performed by: THORACIC SURGERY (CARDIOTHORACIC VASCULAR SURGERY)

## 2024-09-11 RX ORDER — BUDESONIDE AND FORMOTEROL FUMARATE DIHYDRATE 160; 4.5 UG/1; UG/1
2 AEROSOL RESPIRATORY (INHALATION)
COMMUNITY

## 2024-09-11 RX ORDER — TRAMADOL HYDROCHLORIDE 50 MG/1
50 TABLET ORAL
COMMUNITY

## 2024-09-11 RX ORDER — TOPIRAMATE 50 MG/1
50 TABLET, FILM COATED ORAL 2 TIMES DAILY
COMMUNITY

## 2024-09-11 RX ORDER — BISMUTH SUBSALICYLATE 262 MG
1 TABLET,CHEWABLE ORAL DAILY
COMMUNITY

## 2024-09-11 RX ORDER — HYDROXYCHLOROQUINE SULFATE 200 MG/1
200 TABLET, FILM COATED ORAL 2 TIMES DAILY
COMMUNITY

## 2024-09-11 RX ORDER — ERGOCALCIFEROL 1.25 MG/1
1.25 CAPSULE ORAL WEEKLY
COMMUNITY

## 2024-09-11 RX ORDER — ACETAMINOPHEN, DIPHENHYDRAMINE HCL, PHENYLEPHRINE HCL 325; 25; 5 MG/1; MG/1; MG/1
TABLET ORAL DAILY
COMMUNITY

## 2024-09-11 RX ORDER — SERTRALINE HYDROCHLORIDE 150 MG/1
CAPSULE ORAL
COMMUNITY

## 2024-09-11 RX ORDER — ATORVASTATIN CALCIUM 80 MG/1
80 TABLET, FILM COATED ORAL DAILY
COMMUNITY

## 2024-09-11 RX ORDER — LEVOTHYROXINE SODIUM 200 UG/1
200 TABLET ORAL DAILY
COMMUNITY

## 2024-09-11 RX ORDER — MONTELUKAST SODIUM 10 MG/1
10 TABLET ORAL NIGHTLY
COMMUNITY

## 2024-09-11 RX ORDER — PRAZOSIN HYDROCHLORIDE 1 MG/1
1 CAPSULE ORAL NIGHTLY
COMMUNITY

## 2024-09-11 RX ORDER — AMITRIPTYLINE HYDROCHLORIDE 25 MG/1
25 TABLET, FILM COATED ORAL NIGHTLY
COMMUNITY

## 2024-09-11 RX ORDER — HYDROCHLOROTHIAZIDE 25 MG/1
25 TABLET ORAL DAILY
COMMUNITY

## 2024-09-11 RX ORDER — DIVALPROEX SODIUM 250 MG/1
250 TABLET, DELAYED RELEASE ORAL 2 TIMES DAILY
COMMUNITY

## 2024-09-11 RX ORDER — ALBUTEROL SULFATE 0.83 MG/ML
2.5 SOLUTION RESPIRATORY (INHALATION) EVERY 4 HOURS PRN
COMMUNITY

## 2024-09-11 RX ORDER — PANTOPRAZOLE SODIUM 20 MG/1
20 TABLET, DELAYED RELEASE ORAL
COMMUNITY

## 2024-09-11 RX ORDER — PYRIDOXINE HCL (VITAMIN B6) 100 MG
100 TABLET ORAL DAILY
COMMUNITY

## 2024-09-11 RX ORDER — ASPIRIN 81 MG/1
81 TABLET ORAL DAILY
COMMUNITY

## 2024-09-11 RX ORDER — DILTIAZEM HYDROCHLORIDE 180 MG/1
180 CAPSULE, EXTENDED RELEASE ORAL DAILY
COMMUNITY

## 2024-09-11 RX ORDER — CELECOXIB 200 MG/1
200 CAPSULE ORAL DAILY
COMMUNITY

## 2024-09-11 RX ORDER — GABAPENTIN 100 MG/1
100 CAPSULE ORAL 3 TIMES DAILY
COMMUNITY

## 2024-09-11 ASSESSMENT — ENCOUNTER SYMPTOMS
DEPRESSION: 0
OCCASIONAL FEELINGS OF UNSTEADINESS: 1
LOSS OF SENSATION IN FEET: 0

## 2024-09-11 ASSESSMENT — PAIN SCALES - GENERAL: PAINLEVEL: 0-NO PAIN

## 2024-09-12 DIAGNOSIS — C50.919 CARCINOMA OF BREAST METASTATIC TO LUNG, UNSPECIFIED LATERALITY (MULTI): Primary | ICD-10-CM

## 2024-09-12 DIAGNOSIS — C78.00 CARCINOMA OF BREAST METASTATIC TO LUNG, UNSPECIFIED LATERALITY (MULTI): Primary | ICD-10-CM

## 2024-09-17 ENCOUNTER — LAB REQUISITION (OUTPATIENT)
Dept: LAB | Facility: HOSPITAL | Age: 74
End: 2024-09-17
Payer: COMMERCIAL

## 2024-09-17 DIAGNOSIS — I10 ESSENTIAL (PRIMARY) HYPERTENSION: ICD-10-CM

## 2024-09-17 LAB
ALBUMIN SERPL BCP-MCNC: 4 G/DL (ref 3.4–5)
ALP SERPL-CCNC: 72 U/L (ref 33–136)
ALT SERPL W P-5'-P-CCNC: 15 U/L (ref 7–45)
ANION GAP SERPL CALC-SCNC: 14 MMOL/L (ref 10–20)
AST SERPL W P-5'-P-CCNC: 18 U/L (ref 9–39)
BILIRUB SERPL-MCNC: 0.8 MG/DL (ref 0–1.2)
BUN SERPL-MCNC: 28 MG/DL (ref 6–23)
CALCIUM SERPL-MCNC: 9.3 MG/DL (ref 8.6–10.3)
CHLORIDE SERPL-SCNC: 105 MMOL/L (ref 98–107)
CHOLEST SERPL-MCNC: 137 MG/DL (ref 0–199)
CHOLESTEROL/HDL RATIO: 3.9
CO2 SERPL-SCNC: 27 MMOL/L (ref 21–32)
CREAT SERPL-MCNC: 1.07 MG/DL (ref 0.5–1.05)
EGFRCR SERPLBLD CKD-EPI 2021: 54 ML/MIN/1.73M*2
ERYTHROCYTE [DISTWIDTH] IN BLOOD BY AUTOMATED COUNT: 13.2 % (ref 11.5–14.5)
GLUCOSE SERPL-MCNC: 93 MG/DL (ref 74–99)
HCT VFR BLD AUTO: 38.9 % (ref 36–46)
HDLC SERPL-MCNC: 35.4 MG/DL
HGB BLD-MCNC: 12.3 G/DL (ref 12–16)
LDLC SERPL CALC-MCNC: 76 MG/DL
MCH RBC QN AUTO: 30.3 PG (ref 26–34)
MCHC RBC AUTO-ENTMCNC: 31.6 G/DL (ref 32–36)
MCV RBC AUTO: 96 FL (ref 80–100)
NON HDL CHOLESTEROL: 102 MG/DL (ref 0–149)
NRBC BLD-RTO: 0 /100 WBCS (ref 0–0)
PLATELET # BLD AUTO: 200 X10*3/UL (ref 150–450)
POTASSIUM SERPL-SCNC: 3.9 MMOL/L (ref 3.5–5.3)
PROT SERPL-MCNC: 6.8 G/DL (ref 6.4–8.2)
RBC # BLD AUTO: 4.06 X10*6/UL (ref 4–5.2)
SODIUM SERPL-SCNC: 142 MMOL/L (ref 136–145)
TRIGL SERPL-MCNC: 130 MG/DL (ref 0–149)
VLDL: 26 MG/DL (ref 0–40)
WBC # BLD AUTO: 6.7 X10*3/UL (ref 4.4–11.3)

## 2024-09-17 PROCEDURE — 36415 COLL VENOUS BLD VENIPUNCTURE: CPT | Mod: OUT | Performed by: INTERNAL MEDICINE

## 2024-09-17 PROCEDURE — 85027 COMPLETE CBC AUTOMATED: CPT | Mod: OUT | Performed by: INTERNAL MEDICINE

## 2024-09-17 PROCEDURE — 84075 ASSAY ALKALINE PHOSPHATASE: CPT | Mod: OUT | Performed by: INTERNAL MEDICINE

## 2024-09-17 PROCEDURE — 80061 LIPID PANEL: CPT | Mod: OUT | Performed by: INTERNAL MEDICINE

## 2024-09-25 ENCOUNTER — HOSPITAL ENCOUNTER (OUTPATIENT)
Dept: RADIATION ONCOLOGY | Facility: CLINIC | Age: 74
Setting detail: RADIATION/ONCOLOGY SERIES
Discharge: HOME | End: 2024-09-25
Payer: COMMERCIAL

## 2024-09-25 VITALS
SYSTOLIC BLOOD PRESSURE: 130 MMHG | OXYGEN SATURATION: 97 % | TEMPERATURE: 98.2 F | DIASTOLIC BLOOD PRESSURE: 84 MMHG | RESPIRATION RATE: 18 BRPM | HEART RATE: 89 BPM

## 2024-09-25 DIAGNOSIS — C50.911 MALIGNANT NEOPLASM OF RIGHT BREAST IN FEMALE, ESTROGEN RECEPTOR POSITIVE, UNSPECIFIED SITE OF BREAST: ICD-10-CM

## 2024-09-25 DIAGNOSIS — C50.919 CARCINOMA OF BREAST METASTATIC TO LUNG, UNSPECIFIED LATERALITY (MULTI): ICD-10-CM

## 2024-09-25 DIAGNOSIS — Z17.0 MALIGNANT NEOPLASM OF RIGHT BREAST IN FEMALE, ESTROGEN RECEPTOR POSITIVE, UNSPECIFIED SITE OF BREAST: ICD-10-CM

## 2024-09-25 DIAGNOSIS — C50.911 BREAST CANCER METASTASIZED TO LUNG, RIGHT (MULTI): Primary | ICD-10-CM

## 2024-09-25 DIAGNOSIS — C78.00 BREAST CANCER METASTASIZED TO LUNG, RIGHT (MULTI): Primary | ICD-10-CM

## 2024-09-25 DIAGNOSIS — C78.00 CARCINOMA OF BREAST METASTATIC TO LUNG, UNSPECIFIED LATERALITY (MULTI): ICD-10-CM

## 2024-09-25 PROCEDURE — 99215 OFFICE O/P EST HI 40 MIN: CPT | Performed by: STUDENT IN AN ORGANIZED HEALTH CARE EDUCATION/TRAINING PROGRAM

## 2024-09-25 PROCEDURE — 99205 OFFICE O/P NEW HI 60 MIN: CPT | Performed by: STUDENT IN AN ORGANIZED HEALTH CARE EDUCATION/TRAINING PROGRAM

## 2024-09-25 PROCEDURE — G2211 COMPLEX E/M VISIT ADD ON: HCPCS | Performed by: STUDENT IN AN ORGANIZED HEALTH CARE EDUCATION/TRAINING PROGRAM

## 2024-09-25 RX ORDER — DIGOXIN 125 MCG
125 TABLET ORAL DAILY
COMMUNITY

## 2024-09-25 RX ORDER — AMIODARONE HYDROCHLORIDE 200 MG/1
200 TABLET ORAL DAILY
COMMUNITY

## 2024-09-25 RX ORDER — ALENDRONATE SODIUM 70 MG/1
70 TABLET ORAL
COMMUNITY

## 2024-09-25 RX ORDER — ANASTROZOLE 1 MG/1
1 TABLET ORAL DAILY
COMMUNITY

## 2024-09-25 RX ORDER — MIDODRINE HYDROCHLORIDE 5 MG/1
5 TABLET ORAL AS NEEDED
COMMUNITY

## 2024-09-25 RX ORDER — FLUTICASONE PROPIONATE AND SALMETEROL 100; 50 UG/1; UG/1
1 POWDER RESPIRATORY (INHALATION)
COMMUNITY

## 2024-09-25 RX ORDER — FUROSEMIDE 20 MG/1
20 TABLET ORAL DAILY
COMMUNITY

## 2024-09-25 ASSESSMENT — PATIENT HEALTH QUESTIONNAIRE - PHQ9
1. LITTLE INTEREST OR PLEASURE IN DOING THINGS: NOT AT ALL
SUM OF ALL RESPONSES TO PHQ9 QUESTIONS 1 AND 2: 0
2. FEELING DOWN, DEPRESSED OR HOPELESS: NOT AT ALL

## 2024-09-25 ASSESSMENT — ENCOUNTER SYMPTOMS
CARDIOVASCULAR NEGATIVE: 1
FATIGUE: 1
NEUROLOGICAL NEGATIVE: 1
HEMATOLOGIC/LYMPHATIC NEGATIVE: 1
EYES NEGATIVE: 1
PSYCHIATRIC NEGATIVE: 1
BACK PAIN: 1
CONSTIPATION: 1
ENDOCRINE NEGATIVE: 1
COUGH: 1

## 2024-09-25 ASSESSMENT — COLUMBIA-SUICIDE SEVERITY RATING SCALE - C-SSRS
6. HAVE YOU EVER DONE ANYTHING, STARTED TO DO ANYTHING, OR PREPARED TO DO ANYTHING TO END YOUR LIFE?: NO
2. HAVE YOU ACTUALLY HAD ANY THOUGHTS OF KILLING YOURSELF?: NO
1. IN THE PAST MONTH, HAVE YOU WISHED YOU WERE DEAD OR WISHED YOU COULD GO TO SLEEP AND NOT WAKE UP?: NO

## 2024-09-25 ASSESSMENT — PAIN SCALES - GENERAL: PAINLEVEL: 0-NO PAIN

## 2024-09-25 NOTE — PROGRESS NOTES
Radiation Oncology Outpatient Consult    Patient Name:  Adriana Holder  MRN:  [id number]  :  1950    Referring Provider: Martell Dixon MD  Primary Care Provider: Senthil Landers MD  Care Team: Patient Care Team:  Senthil Landers MD as PCP - General (Internal Medicine)  Abelardo Vidales MD as Consulting Physician (Cardiology)    Date of Service: 2024     SUBJECTIVE  History of Present Illness:  Adriana Holder is a 74 y.o. female with a diagnosis of metastatic right breast cancer with a solitary site of oligometastasis to the right upper lobe of the lung.  She is referred to me by Dr. Martell Dixon for evaluation and management.  In  she was diagnosed with a pT2N0 IDC of the right breast, ER+ WA+ HER2-negative, s/p lumpectomy/SLNB with negative margins after re-resection, adjuvant right breast RT (40.05 Gy / 15 fx completed 10/2020), and arimidex. The patient suffered a fall in 2024, which resulted in a CT of the chest abdomen pelvis on 2024 which incidentally noted to right upper lobe lung nodules, the largest measuring 1.1 cm.  The other measured 4 mm on my review.  She was referred to Martell Dixon for further evaluation of the nodules. PET/CT on 2024 showed FDG avid 1.1 cm subpleural right upper lobe lung nodule concerning for a lung neoplasm.  Other subcentimeter pulmonary nodules were not FDG avid.  No other concerning FDG avid disease identified.  She underwent CT-guided biopsy of the right upper lobe on 2024, pathology showing adenocarcinoma consistent with metastasis from breast primary. ER+ 95%, WA negative, HER2 equivocal. Dual BRICE showed that 5-10% of the carcinoma showed amplification by HER2, and the remaining did not show amplification.  She was recommended pulmonary function tests which showed FEV1 52% predicted.  Due to inability of patient to follow the needed directions, DLCO data could not be collected.  After discussing surgical management with   Zack, the patient wished to pursue nonsurgical approaches for this metastasis.  She presents today alone.  She resides at [facility].  She presents in wheelchair and has limited ambulation but can stand to pivot.   She denies significant shortness of breath, though does endorse a cough that she attributes to postnasal drip.  No chest pains or recent appetite loss.    Past Medical History:    Past Medical History:   Diagnosis Date    Breast cancer (Multi)     GERD (gastroesophageal reflux disease)     Heart disease     Hypercholesteremia     Hypertension         Past Surgical History:    Past Surgical History:   Procedure Laterality Date    BREAST LUMPECTOMY          Family History:  Cancer-related family history includes Cancer in her sister.    Social History:    Social History     Tobacco Use    Smoking status: Never    Smokeless tobacco: Never   Substance Use Topics    Alcohol use: Never    Drug use: Never       Allergies:    Allergies   Allergen Reactions    Ibuprofen Nausea/vomiting    Tylenol [Acetaminophen] Unknown    Codeine Unknown        Medications:    Current Outpatient Medications:     acetaminophen (Tylenol) 325 mg tablet, Take 2 tablets (650 mg) by mouth every 8 hours if needed., Disp: , Rfl:     alendronate (Fosamax) 70 mg tablet, Take 1 tablet (70 mg) by mouth every 7 days. Take in the morning with a full glass of water, on an empty stomach, and do not take anything else by mouth or lie down for the next 30 min., Disp: , Rfl:     amiodarone (Pacerone) 200 mg tablet, Take 1 tablet (200 mg) by mouth once daily., Disp: , Rfl:     anastrozole (Arimidex) 1 mg tablet, Take 1 tablet (1 mg total) by mouth once daily.  Swallow whole with a drink of water., Disp: , Rfl:     apixaban (Eliquis) 5 mg tablet, Take 1 tablet (5 mg) by mouth 2 times a day., Disp: , Rfl:     atorvastatin (Lipitor) 80 mg tablet, Take 1 tablet (80 mg) by mouth once daily., Disp: , Rfl:     BISACODYL ORAL, once daily  as needed. Insert 1 suppository rectally once daily, Disp: , Rfl:     calcium carbonate-vitamin D3 (Oscal-500) 500 mg-10 mcg (400 unit) tablet, Take 2 tablets by mouth once daily., Disp: , Rfl:     digoxin (Lanoxin) 125 MCG tablet, Take 1 tablet (125 mcg) by mouth once daily., Disp: , Rfl:     fluticasone (Flonase) 50 mcg/actuation nasal spray, , Disp: , Rfl:     furosemide (Lasix) 20 mg tablet, Take 1 tablet (20 mg) by mouth once daily., Disp: , Rfl:     magnesium hydroxide (Milk of Magnesia) 400 mg/5 mL suspension, Take 30 mL by mouth once daily., Disp: , Rfl:     meclizine (Antivert) 25 mg tablet, Take by mouth., Disp: , Rfl:     metoprolol tartrate (Lopressor) 50 mg tablet, 0.5 tablets., Disp: , Rfl:     midodrine (Proamatine) 5 mg tablet, Take 1 tablet (5 mg) by mouth if needed (for hypotension)., Disp: , Rfl:     omeprazole (PriLOSEC) 40 mg DR capsule, Take 20 mg by mouth once daily. Do not crush or chew., Disp: , Rfl:     ondansetron (Zofran) 4 mg tablet, Take 4 mg by mouth every 8 hours as needed., Disp: , Rfl:     potassium chloride CR 10 mEq ER tablet, Take 2 tablets (20 mEq) by mouth once daily. Do not crush, chew, or split., Disp: , Rfl:     albuterol 2.5 mg /3 mL (0.083 %) nebulizer solution, Take 3 mL (2.5 mg) by nebulization every 4 hours if needed for wheezing., Disp: , Rfl:     amitriptyline (Elavil) 25 mg tablet, Take 1 tablet (25 mg) by mouth once daily at bedtime., Disp: , Rfl:     ammonium lactate (Lac-Hydrin) 12 % lotion, Apply and rub in a thin film to affected areas twice daily (am and pm)., Disp: , Rfl:     aspirin 81 mg EC tablet, Take 1 tablet (81 mg) by mouth once daily., Disp: , Rfl:     budesonide-formoteroL (Symbicort) 160-4.5 mcg/actuation inhaler, Inhale 2 puffs 2 times a day. Rinse mouth with water after use to reduce aftertaste and incidence of candidiasis. Do not swallow., Disp: , Rfl:     cariprazine (Vraylar) 1.5 mg capsule, Take 1 capsule (1.5 mg) by mouth once daily., Disp: ,  Rfl:     celecoxib (CeleBREX) 200 mg capsule, Take 1 capsule (200 mg) by mouth once daily. Monday, Wednesday, friday, Disp: , Rfl:     dilTIAZem XR (Dilacor XR) 180 mg 24 hr capsule, Take 1 capsule (180 mg) by mouth once daily., Disp: , Rfl:     divalproex (Depakote) 250 mg EC tablet, Take 1 tablet (250 mg) by mouth 2 times a day. Do not crush, chew, or split., Disp: , Rfl:     ergocalciferol (Vitamin D2) 1.25 MG (61591 UT) capsule, Take 1 capsule (1,250 mcg) by mouth 1 (one) time per week., Disp: , Rfl:     fluticasone propion-salmeteroL (Advair Diskus) 100-50 mcg/dose diskus inhaler, Inhale 1 puff 2 times a day. Rinse mouth with water after use to reduce aftertaste and incidence of candidiasis. Do not swallow., Disp: , Rfl:     gabapentin (Neurontin) 100 mg capsule, Take 1 capsule (100 mg) by mouth 3 times a day., Disp: , Rfl:     hydroCHLOROthiazide (HYDRODiuril) 25 mg tablet, Take 1 tablet (25 mg) by mouth once daily., Disp: , Rfl:     hydroxychloroquine (Plaquenil) 200 mg tablet, Take 1 tablet (200 mg) by mouth 2 times a day., Disp: , Rfl:     levothyroxine (Synthroid, Levoxyl) 200 mcg tablet, Take 1 tablet (200 mcg) by mouth early in the morning.. Take on an empty stomach at the same time each day, either 30 to 60 minutes prior to breakfast, Disp: , Rfl:     lidocaine (Lidoderm) 5 % patch, Place 1 patch over 12 hours on the skin once daily. Remove & discard patch within 12 hours or as directed by MD. (Patient not taking: Reported on 9/11/2024), Disp: 5 patch, Rfl: 0    melatonin 10 mg tablet, Take by mouth once daily., Disp: , Rfl:     montelukast (Singulair) 10 mg tablet, Take 1 tablet (10 mg) by mouth once daily at bedtime., Disp: , Rfl:     multivitamin tablet, Take 1 tablet by mouth once daily., Disp: , Rfl:     pantoprazole (ProtoNix) 20 mg EC tablet, Take 1 tablet (20 mg) by mouth once daily in the morning. Take before meals. Do not crush, chew, or split., Disp: , Rfl:     prazosin (Minipress) 1 mg  capsule, Take 1 capsule (1 mg) by mouth once daily at bedtime., Disp: , Rfl:     pyridoxine (Vitamin B-6) 100 mg tablet, Take 1 tablet (100 mg) by mouth once daily., Disp: , Rfl:     sertraline 150 mg capsule, Take by mouth., Disp: , Rfl:     topiramate (Topamax) 50 mg tablet, Take 1 tablet (50 mg) by mouth 2 times a day., Disp: , Rfl:     traMADol (Ultram) 50 mg tablet, Take 1 tablet (50 mg) by mouth., Disp: , Rfl:       Review of Systems:  Review of Systems - Oncology - please see nursing note    Performance Status:  The Karnofsky performance scale today is 60, Requires occasional assistance, but is able to care for most of her personal needs (ECOG equivalent 2).        OBJECTIVE  /84   Pulse 89   Temp 36.8 °C (98.2 °F)   Resp 18   SpO2 97%    Physical Exam  Vitals reviewed.   Constitutional:       General: She is not in acute distress.  HENT:      Head: Normocephalic and atraumatic.   Pulmonary:      Effort: Pulmonary effort is normal. No respiratory distress.   Abdominal:      General: There is no distension.   Skin:     Findings: No erythema.   Neurological:      Mental Status: She is alert and oriented to person, place, and time.   Psychiatric:         Mood and Affect: Mood normal.         Behavior: Behavior normal.          Pathology Review:  The pertinent pathology results were reviewed and discussed with the patient.     Imaging:  The pertinent imaging results were reviewed and discussed with the patient.        ASSESSMENT:  Adriana Holder is a 74 y.o. female with a history of pT2N0 right breast IDC (ER+ AZ+ HER2-neg) s/p BCS, right breast RT in 2020, and Arimidex. Subsequent development of a solitary biopsy-proven site of oligometastatasis to the RUL lung. Of note, biopsy showed ER+ AZ-negative HER2 equivocal and amplified in 5-10% of the carcinoma, the remaining 90-95% was not amplified. She discussed surgery with Dr. Dixon however wished to pursue nonsurgical management.     PLAN:  She has a  solitary site of metastatic disease in the right upper lobe.   We discussed the role of radiotherapy in the management of limited sites of metastases.  I believe that aggressive local therapy can be considered in this case, as this is her only  known site of disease and she has gone an interval of many years prior to the development of a solitary metastasis.  I offered the patient a 5 fraction course of SBRT to the right upper lobe for local control.  We discussed the logistics of radiation planning including CT simulation.   Acute side effects of treatment include but are not limited to fatigue, focal skin reaction, cough, shortness of breath, pneumonitis, chest wall pain.  Long-term risks of treatment include but are not limited to fibrosis of the lung, chest wall toxicity, damage to other organs of the thorax including the proximal bronchial tree, heart, esophagus, blood vessels, spinal cord, rare risk of secondary malignancy.  She stated her understanding and wishes to proceed.  Informed consent was signed.  CT simulation will be scheduled.    I will discuss with medical oncology the plan for any possible systemic treatment after radiotherapy is completed, and coordination of future surveillance scans.    Thank you for allowing me to participate in this patient's care. Effort is required for continued longitudinal patient care.    John Simmons MD  Department of Radiation Oncology  [facility]    Portions of this note were generated using voice recognition software, and may be subject to transcription errors.

## 2024-09-25 NOTE — PROGRESS NOTES
Radiation Oncology Nursing Note    Prior Radiotherapy:  Yes, describe: breast cancer radiation at [facility]  No radiation treatments to show. (Treatments may have been administered in another system.)     Current Systemic Treatment:  No     Presence of Pacemaker or ICD:  No    History of Autoimmune or Connective Tissue Disorders:  No    Pain: The patient's current pain level was assessed.  They report currently having a pain of 0 out of 10.  They feel their pain is under control without the use of pain medications.    Review of Systems:  Review of Systems   Constitutional:  Positive for fatigue.   HENT:   Positive for hearing loss.    Eyes: Negative.    Respiratory:  Positive for cough.    Cardiovascular: Negative.    Gastrointestinal:  Positive for constipation.   Endocrine: Negative.    Genitourinary: Negative.     Musculoskeletal:  Positive for back pain.   Skin: Negative.    Neurological: Negative.    Hematological: Negative.    Psychiatric/Behavioral: Negative.

## 2024-10-02 ENCOUNTER — APPOINTMENT (OUTPATIENT)
Dept: CARDIOLOGY | Facility: CLINIC | Age: 74
End: 2024-10-02
Payer: COMMERCIAL

## 2024-10-02 VITALS — DIASTOLIC BLOOD PRESSURE: 70 MMHG | SYSTOLIC BLOOD PRESSURE: 120 MMHG

## 2024-10-02 DIAGNOSIS — E78.00 PURE HYPERCHOLESTEROLEMIA: Primary | ICD-10-CM

## 2024-10-02 DIAGNOSIS — I10 PRIMARY HYPERTENSION: ICD-10-CM

## 2024-10-02 DIAGNOSIS — I77.819 AORTIC DILATATION (CMS-HCC): ICD-10-CM

## 2024-10-02 DIAGNOSIS — N18.30 STAGE 3 CHRONIC KIDNEY DISEASE, UNSPECIFIED WHETHER STAGE 3A OR 3B CKD (MULTI): ICD-10-CM

## 2024-10-02 DIAGNOSIS — I50.32 CHRONIC HEART FAILURE WITH PRESERVED EJECTION FRACTION: ICD-10-CM

## 2024-10-02 DIAGNOSIS — I48.11 LONGSTANDING PERSISTENT ATRIAL FIBRILLATION (MULTI): ICD-10-CM

## 2024-10-02 PROCEDURE — 99214 OFFICE O/P EST MOD 30 MIN: CPT | Performed by: INTERNAL MEDICINE

## 2024-10-02 PROCEDURE — 3074F SYST BP LT 130 MM HG: CPT | Performed by: INTERNAL MEDICINE

## 2024-10-02 PROCEDURE — 3078F DIAST BP <80 MM HG: CPT | Performed by: INTERNAL MEDICINE

## 2024-10-02 PROCEDURE — 1036F TOBACCO NON-USER: CPT | Performed by: INTERNAL MEDICINE

## 2024-10-02 PROCEDURE — 1160F RVW MEDS BY RX/DR IN RCRD: CPT | Performed by: INTERNAL MEDICINE

## 2024-10-02 PROCEDURE — 1159F MED LIST DOCD IN RCRD: CPT | Performed by: INTERNAL MEDICINE

## 2024-10-02 NOTE — PROGRESS NOTES
Subjective  Adriana Holder  is a 74 y.o. year old female who presents for Paroxysmal atrial fibrillation.  Developed lung mets note on CT chest 4/30/24 showing dilation of the ascending aorta at 4.4 cm seen by Dr. Miles who has scheduled a follow up CT chest for 10/14/24.  No chest pain, no dyspnea, gets exhausted after she eats her breakfast    Blood pressure 120/70.   Ibuprofen, Tylenol [acetaminophen], and Codeine  Past Medical History:   Diagnosis Date    Breast cancer (Multi)     GERD (gastroesophageal reflux disease)     Heart disease     Hypercholesteremia     Hypertension      Past Surgical History:   Procedure Laterality Date    BREAST LUMPECTOMY       Family History   Problem Relation Name Age of Onset    Coronary artery disease Mother      Heart attack Mother      Cancer Sister          uterine or ovarian cancer     @SOC    Current Outpatient Medications   Medication Sig Dispense Refill    acetaminophen (Tylenol) 325 mg tablet Take 2 tablets (650 mg) by mouth every 8 hours if needed.      albuterol 2.5 mg /3 mL (0.083 %) nebulizer solution Take 3 mL (2.5 mg) by nebulization every 4 hours if needed for wheezing.      alendronate (Fosamax) 70 mg tablet Take 1 tablet (70 mg) by mouth every 7 days. Take in the morning with a full glass of water, on an empty stomach, and do not take anything else by mouth or lie down for the next 30 min.      amiodarone (Pacerone) 200 mg tablet Take 1 tablet (200 mg) by mouth once daily.      amitriptyline (Elavil) 25 mg tablet Take 1 tablet (25 mg) by mouth once daily at bedtime.      ammonium lactate (Lac-Hydrin) 12 % lotion Apply and rub in a thin film to affected areas twice daily (am and pm).      anastrozole (Arimidex) 1 mg tablet Take 1 tablet (1 mg total) by mouth once daily.  Swallow whole with a drink of water.      apixaban (Eliquis) 5 mg tablet Take 1 tablet (5 mg) by mouth 2 times a day.      aspirin 81 mg EC tablet Take 1 tablet (81 mg) by mouth once daily.       atorvastatin (Lipitor) 80 mg tablet Take 1 tablet (80 mg) by mouth once daily.      BISACODYL ORAL once daily as needed. Insert 1 suppository rectally once daily      budesonide-formoteroL (Symbicort) 160-4.5 mcg/actuation inhaler Inhale 2 puffs 2 times a day. Rinse mouth with water after use to reduce aftertaste and incidence of candidiasis. Do not swallow.      calcium carbonate-vitamin D3 (Oscal-500) 500 mg-10 mcg (400 unit) tablet Take 2 tablets by mouth once daily.      cariprazine (Vraylar) 1.5 mg capsule Take 1 capsule (1.5 mg) by mouth once daily.      celecoxib (CeleBREX) 200 mg capsule Take 1 capsule (200 mg) by mouth once daily. Monday, Wednesday, friday      digoxin (Lanoxin) 125 MCG tablet Take 1 tablet (125 mcg) by mouth once daily.      dilTIAZem XR (Dilacor XR) 180 mg 24 hr capsule Take 1 capsule (180 mg) by mouth once daily.      divalproex (Depakote) 250 mg EC tablet Take 1 tablet (250 mg) by mouth 2 times a day. Do not crush, chew, or split.      ergocalciferol (Vitamin D2) 1.25 MG (62449 UT) capsule Take 1 capsule (1,250 mcg) by mouth 1 (one) time per week.      fluticasone (Flonase) 50 mcg/actuation nasal spray       fluticasone propion-salmeteroL (Advair Diskus) 100-50 mcg/dose diskus inhaler Inhale 1 puff 2 times a day. Rinse mouth with water after use to reduce aftertaste and incidence of candidiasis. Do not swallow.      furosemide (Lasix) 20 mg tablet Take 1 tablet (20 mg) by mouth once daily.      gabapentin (Neurontin) 100 mg capsule Take 1 capsule (100 mg) by mouth 3 times a day.      hydroCHLOROthiazide (HYDRODiuril) 25 mg tablet Take 1 tablet (25 mg) by mouth once daily.      hydroxychloroquine (Plaquenil) 200 mg tablet Take 1 tablet (200 mg) by mouth 2 times a day.      levothyroxine (Synthroid, Levoxyl) 200 mcg tablet Take 1 tablet (200 mcg) by mouth early in the morning.. Take on an empty stomach at the same time each day, either 30 to 60 minutes prior to breakfast      lidocaine  (Lidoderm) 5 % patch Place 1 patch over 12 hours on the skin once daily. Remove & discard patch within 12 hours or as directed by MD. (Patient not taking: Reported on 9/11/2024) 5 patch 0    magnesium hydroxide (Milk of Magnesia) 400 mg/5 mL suspension Take 30 mL by mouth once daily.      meclizine (Antivert) 25 mg tablet Take by mouth.      melatonin 10 mg tablet Take by mouth once daily.      metoprolol tartrate (Lopressor) 50 mg tablet 0.5 tablets.      midodrine (Proamatine) 5 mg tablet Take 1 tablet (5 mg) by mouth if needed (for hypotension).      montelukast (Singulair) 10 mg tablet Take 1 tablet (10 mg) by mouth once daily at bedtime.      multivitamin tablet Take 1 tablet by mouth once daily.      omeprazole (PriLOSEC) 40 mg DR capsule Take 20 mg by mouth once daily. Do not crush or chew.      ondansetron (Zofran) 4 mg tablet Take 4 mg by mouth every 8 hours as needed.      pantoprazole (ProtoNix) 20 mg EC tablet Take 1 tablet (20 mg) by mouth once daily in the morning. Take before meals. Do not crush, chew, or split.      potassium chloride CR 10 mEq ER tablet Take 2 tablets (20 mEq) by mouth once daily. Do not crush, chew, or split.      prazosin (Minipress) 1 mg capsule Take 1 capsule (1 mg) by mouth once daily at bedtime.      pyridoxine (Vitamin B-6) 100 mg tablet Take 1 tablet (100 mg) by mouth once daily.      sertraline 150 mg capsule Take by mouth.      topiramate (Topamax) 50 mg tablet Take 1 tablet (50 mg) by mouth 2 times a day.      traMADol (Ultram) 50 mg tablet Take 1 tablet (50 mg) by mouth.       No current facility-administered medications for this visit.        ROS      Physical Exam  Physical Exam  Constitutional:       Appearance: Normal appearance.   HENT:      Head: Normocephalic and atraumatic.   Cardiovascular:      Rate and Rhythm: Normal rate. Rhythm irregularly irregular.   Abdominal:      General: Abdomen is flat.   Skin:     General: Skin is warm and dry.   Neurological:       General: No focal deficit present.      Mental Status: She is alert and oriented to person, place, and time.   Psychiatric:         Mood and Affect: Mood normal.         Behavior: Behavior normal.          EKG  Encounter Date: 04/02/24   ECG 12 Lead    Narrative    Atrial fibrillation at 82/min., ST-T abn.       Problem List Items Addressed This Visit       Atrial fibrillation (Multi)     Rate controlled          CKD (chronic kidney disease), stage III (Multi)    Hyperlipidemia - Primary     9/17/24 Tchol = 137, HDL = 35.6, LDL = 76, Trig = 130         Hypertension    Chronic heart failure with preserved ejection fraction    Aortic dilatation (CMS-HCC)     Noted on CT chest 4/30/24 at 4.4 cm followed by Ginger Victor Same meds with return 6 months  Repeat CT chest per Dr. Ginger Vidales MD

## 2024-10-08 ENCOUNTER — HOSPITAL ENCOUNTER (OUTPATIENT)
Dept: RADIOLOGY | Facility: EXTERNAL LOCATION | Age: 74
Discharge: HOME | End: 2024-10-08

## 2024-10-08 ENCOUNTER — HOSPITAL ENCOUNTER (OUTPATIENT)
Dept: RADIATION ONCOLOGY | Facility: HOSPITAL | Age: 74
Setting detail: RADIATION/ONCOLOGY SERIES
Discharge: HOME | End: 2024-10-08
Payer: COMMERCIAL

## 2024-10-08 DIAGNOSIS — Z85.3 HISTORY OF BREAST CANCER: ICD-10-CM

## 2024-10-08 DIAGNOSIS — R91.8 LUNG NODULES: ICD-10-CM

## 2024-10-08 DIAGNOSIS — C34.11 PRIMARY MALIGNANT NEOPLASM OF BRONCHUS OF RIGHT UPPER LOBE (MULTI): Primary | ICD-10-CM

## 2024-10-08 DIAGNOSIS — C34.11 PRIMARY MALIGNANT NEOPLASM OF BRONCHUS OF RIGHT UPPER LOBE (MULTI): ICD-10-CM

## 2024-10-08 PROCEDURE — 77290 THER RAD SIMULAJ FIELD CPLX: CPT | Performed by: STUDENT IN AN ORGANIZED HEALTH CARE EDUCATION/TRAINING PROGRAM

## 2024-10-08 PROCEDURE — 77334 RADIATION TREATMENT AID(S): CPT | Performed by: STUDENT IN AN ORGANIZED HEALTH CARE EDUCATION/TRAINING PROGRAM

## 2024-10-14 ENCOUNTER — HOSPITAL ENCOUNTER (OUTPATIENT)
Dept: RADIATION ONCOLOGY | Facility: CLINIC | Age: 74
Setting detail: RADIATION/ONCOLOGY SERIES
Discharge: HOME | End: 2024-10-14
Payer: COMMERCIAL

## 2024-10-14 ENCOUNTER — HOSPITAL ENCOUNTER (OUTPATIENT)
Dept: RADIATION ONCOLOGY | Facility: HOSPITAL | Age: 74
Setting detail: RADIATION/ONCOLOGY SERIES
Discharge: HOME | End: 2024-10-14
Payer: COMMERCIAL

## 2024-10-14 ENCOUNTER — APPOINTMENT (OUTPATIENT)
Dept: RADIOLOGY | Facility: CLINIC | Age: 74
End: 2024-10-14
Payer: COMMERCIAL

## 2024-10-14 ENCOUNTER — APPOINTMENT (OUTPATIENT)
Dept: RADIATION ONCOLOGY | Facility: HOSPITAL | Age: 74
End: 2024-10-14
Payer: COMMERCIAL

## 2024-10-14 PROCEDURE — 77293 RESPIRATOR MOTION MGMT SIMUL: CPT | Performed by: STUDENT IN AN ORGANIZED HEALTH CARE EDUCATION/TRAINING PROGRAM

## 2024-10-14 PROCEDURE — 77295 3-D RADIOTHERAPY PLAN: CPT | Performed by: STUDENT IN AN ORGANIZED HEALTH CARE EDUCATION/TRAINING PROGRAM

## 2024-10-14 PROCEDURE — 77300 RADIATION THERAPY DOSE PLAN: CPT | Performed by: STUDENT IN AN ORGANIZED HEALTH CARE EDUCATION/TRAINING PROGRAM

## 2024-10-14 PROCEDURE — 77334 RADIATION TREATMENT AID(S): CPT | Performed by: STUDENT IN AN ORGANIZED HEALTH CARE EDUCATION/TRAINING PROGRAM

## 2024-10-15 ENCOUNTER — APPOINTMENT (OUTPATIENT)
Dept: CARDIAC SURGERY | Facility: CLINIC | Age: 74
End: 2024-10-15
Payer: COMMERCIAL

## 2024-10-15 RX ORDER — ATORVASTATIN CALCIUM 10 MG/1
10 TABLET, FILM COATED ORAL DAILY
COMMUNITY
Start: 2024-10-02

## 2024-10-21 ENCOUNTER — APPOINTMENT (OUTPATIENT)
Dept: CARDIAC SURGERY | Facility: CLINIC | Age: 74
End: 2024-10-21
Payer: COMMERCIAL

## 2024-10-23 ENCOUNTER — HOSPITAL ENCOUNTER (OUTPATIENT)
Dept: RADIATION ONCOLOGY | Facility: CLINIC | Age: 74
Setting detail: RADIATION/ONCOLOGY SERIES
Discharge: HOME | End: 2024-10-23
Payer: COMMERCIAL

## 2024-10-23 DIAGNOSIS — C78.01 SECONDARY MALIGNANT NEOPLASM OF RIGHT LUNG (MULTI): ICD-10-CM

## 2024-10-23 DIAGNOSIS — Z51.0 ENCOUNTER FOR ANTINEOPLASTIC RADIATION THERAPY: ICD-10-CM

## 2024-10-23 LAB
RAD ONC MSQ ACTUAL FRACTIONS DELIVERED: 1
RAD ONC MSQ ACTUAL SESSION DELIVERED DOSE: 1000 CGRAY
RAD ONC MSQ ACTUAL TOTAL DOSE: 1000 CGRAY
RAD ONC MSQ ELAPSED DAYS: 0
RAD ONC MSQ LAST DATE: NORMAL
RAD ONC MSQ PRESCRIBED FRACTIONAL DOSE: 1000 CGRAY
RAD ONC MSQ PRESCRIBED NUMBER OF FRACTIONS: 5
RAD ONC MSQ PRESCRIBED TECHNIQUE: NORMAL
RAD ONC MSQ PRESCRIBED TOTAL DOSE: 5000 CGRAY
RAD ONC MSQ PRESCRIPTION PATTERN COMMENT: NORMAL
RAD ONC MSQ START DATE: NORMAL
RAD ONC MSQ TREATMENT COURSE NUMBER: 1
RAD ONC MSQ TREATMENT SITE: NORMAL

## 2024-10-23 PROCEDURE — 77280 THER RAD SIMULAJ FIELD SMPL: CPT | Performed by: STUDENT IN AN ORGANIZED HEALTH CARE EDUCATION/TRAINING PROGRAM

## 2024-10-23 PROCEDURE — 77373 STRTCTC BDY RAD THER TX DLVR: CPT | Performed by: STUDENT IN AN ORGANIZED HEALTH CARE EDUCATION/TRAINING PROGRAM

## 2024-10-25 ENCOUNTER — HOSPITAL ENCOUNTER (OUTPATIENT)
Dept: RADIATION ONCOLOGY | Facility: CLINIC | Age: 74
Setting detail: RADIATION/ONCOLOGY SERIES
Discharge: HOME | End: 2024-10-25
Payer: COMMERCIAL

## 2024-10-25 DIAGNOSIS — Z51.0 ENCOUNTER FOR ANTINEOPLASTIC RADIATION THERAPY: ICD-10-CM

## 2024-10-25 DIAGNOSIS — C78.01 SECONDARY MALIGNANT NEOPLASM OF RIGHT LUNG (MULTI): ICD-10-CM

## 2024-10-25 LAB
RAD ONC MSQ ACTUAL FRACTIONS DELIVERED: 2
RAD ONC MSQ ACTUAL SESSION DELIVERED DOSE: 1000 CGRAY
RAD ONC MSQ ACTUAL TOTAL DOSE: 2000 CGRAY
RAD ONC MSQ ELAPSED DAYS: 2
RAD ONC MSQ LAST DATE: NORMAL
RAD ONC MSQ PRESCRIBED FRACTIONAL DOSE: 1000 CGRAY
RAD ONC MSQ PRESCRIBED NUMBER OF FRACTIONS: 5
RAD ONC MSQ PRESCRIBED TECHNIQUE: NORMAL
RAD ONC MSQ PRESCRIBED TOTAL DOSE: 5000 CGRAY
RAD ONC MSQ PRESCRIPTION PATTERN COMMENT: NORMAL
RAD ONC MSQ START DATE: NORMAL
RAD ONC MSQ TREATMENT COURSE NUMBER: 1
RAD ONC MSQ TREATMENT SITE: NORMAL

## 2024-10-25 PROCEDURE — 77373 STRTCTC BDY RAD THER TX DLVR: CPT | Performed by: STUDENT IN AN ORGANIZED HEALTH CARE EDUCATION/TRAINING PROGRAM

## 2024-10-28 ENCOUNTER — HOSPITAL ENCOUNTER (OUTPATIENT)
Dept: RADIATION ONCOLOGY | Facility: CLINIC | Age: 74
Setting detail: RADIATION/ONCOLOGY SERIES
Discharge: HOME | End: 2024-10-28
Payer: COMMERCIAL

## 2024-10-28 DIAGNOSIS — C78.01 SECONDARY MALIGNANT NEOPLASM OF RIGHT LUNG (MULTI): ICD-10-CM

## 2024-10-28 DIAGNOSIS — Z51.0 ENCOUNTER FOR ANTINEOPLASTIC RADIATION THERAPY: ICD-10-CM

## 2024-10-28 LAB
RAD ONC MSQ ACTUAL FRACTIONS DELIVERED: 3
RAD ONC MSQ ACTUAL SESSION DELIVERED DOSE: 1000 CGRAY
RAD ONC MSQ ACTUAL TOTAL DOSE: 3000 CGRAY
RAD ONC MSQ ELAPSED DAYS: 5
RAD ONC MSQ LAST DATE: NORMAL
RAD ONC MSQ PRESCRIBED FRACTIONAL DOSE: 1000 CGRAY
RAD ONC MSQ PRESCRIBED NUMBER OF FRACTIONS: 5
RAD ONC MSQ PRESCRIBED TECHNIQUE: NORMAL
RAD ONC MSQ PRESCRIBED TOTAL DOSE: 5000 CGRAY
RAD ONC MSQ PRESCRIPTION PATTERN COMMENT: NORMAL
RAD ONC MSQ START DATE: NORMAL
RAD ONC MSQ TREATMENT COURSE NUMBER: 1
RAD ONC MSQ TREATMENT SITE: NORMAL

## 2024-10-28 PROCEDURE — 77373 STRTCTC BDY RAD THER TX DLVR: CPT | Performed by: STUDENT IN AN ORGANIZED HEALTH CARE EDUCATION/TRAINING PROGRAM

## 2024-10-30 ENCOUNTER — HOSPITAL ENCOUNTER (OUTPATIENT)
Dept: RADIATION ONCOLOGY | Facility: CLINIC | Age: 74
Setting detail: RADIATION/ONCOLOGY SERIES
Discharge: HOME | End: 2024-10-30
Payer: COMMERCIAL

## 2024-10-30 DIAGNOSIS — C78.01 SECONDARY MALIGNANT NEOPLASM OF RIGHT LUNG (MULTI): ICD-10-CM

## 2024-10-30 DIAGNOSIS — Z51.0 ENCOUNTER FOR ANTINEOPLASTIC RADIATION THERAPY: ICD-10-CM

## 2024-10-30 LAB
RAD ONC MSQ ACTUAL FRACTIONS DELIVERED: 4
RAD ONC MSQ ACTUAL SESSION DELIVERED DOSE: 1000 CGRAY
RAD ONC MSQ ACTUAL TOTAL DOSE: 4000 CGRAY
RAD ONC MSQ ELAPSED DAYS: 7
RAD ONC MSQ LAST DATE: NORMAL
RAD ONC MSQ PRESCRIBED FRACTIONAL DOSE: 1000 CGRAY
RAD ONC MSQ PRESCRIBED NUMBER OF FRACTIONS: 5
RAD ONC MSQ PRESCRIBED TECHNIQUE: NORMAL
RAD ONC MSQ PRESCRIBED TOTAL DOSE: 5000 CGRAY
RAD ONC MSQ PRESCRIPTION PATTERN COMMENT: NORMAL
RAD ONC MSQ START DATE: NORMAL
RAD ONC MSQ TREATMENT COURSE NUMBER: 1
RAD ONC MSQ TREATMENT SITE: NORMAL

## 2024-10-30 PROCEDURE — 77373 STRTCTC BDY RAD THER TX DLVR: CPT | Performed by: STUDENT IN AN ORGANIZED HEALTH CARE EDUCATION/TRAINING PROGRAM

## 2024-11-01 ENCOUNTER — RADIATION ONCOLOGY OTV (OUTPATIENT)
Dept: RADIATION ONCOLOGY | Facility: CLINIC | Age: 74
End: 2024-11-01
Payer: COMMERCIAL

## 2024-11-01 ENCOUNTER — HOSPITAL ENCOUNTER (OUTPATIENT)
Dept: RADIATION ONCOLOGY | Facility: CLINIC | Age: 74
Setting detail: RADIATION/ONCOLOGY SERIES
Discharge: HOME | End: 2024-11-01
Payer: COMMERCIAL

## 2024-11-01 VITALS
DIASTOLIC BLOOD PRESSURE: 76 MMHG | SYSTOLIC BLOOD PRESSURE: 112 MMHG | HEART RATE: 65 BPM | OXYGEN SATURATION: 96 % | TEMPERATURE: 97.3 F | RESPIRATION RATE: 20 BRPM

## 2024-11-01 DIAGNOSIS — C78.00 BREAST CANCER METASTASIZED TO LUNG, RIGHT (MULTI): Primary | ICD-10-CM

## 2024-11-01 DIAGNOSIS — C78.01 SECONDARY MALIGNANT NEOPLASM OF RIGHT LUNG (MULTI): ICD-10-CM

## 2024-11-01 DIAGNOSIS — Z51.0 ENCOUNTER FOR ANTINEOPLASTIC RADIATION THERAPY: ICD-10-CM

## 2024-11-01 DIAGNOSIS — C50.911 BREAST CANCER METASTASIZED TO LUNG, RIGHT (MULTI): Primary | ICD-10-CM

## 2024-11-01 LAB
RAD ONC MSQ ACTUAL FRACTIONS DELIVERED: 5
RAD ONC MSQ ACTUAL SESSION DELIVERED DOSE: 1000 CGRAY
RAD ONC MSQ ACTUAL TOTAL DOSE: 5000 CGRAY
RAD ONC MSQ ELAPSED DAYS: 9
RAD ONC MSQ LAST DATE: NORMAL
RAD ONC MSQ PRESCRIBED FRACTIONAL DOSE: 1000 CGRAY
RAD ONC MSQ PRESCRIBED NUMBER OF FRACTIONS: 5
RAD ONC MSQ PRESCRIBED TECHNIQUE: NORMAL
RAD ONC MSQ PRESCRIBED TOTAL DOSE: 5000 CGRAY
RAD ONC MSQ PRESCRIPTION PATTERN COMMENT: NORMAL
RAD ONC MSQ START DATE: NORMAL
RAD ONC MSQ TREATMENT COURSE NUMBER: 1
RAD ONC MSQ TREATMENT SITE: NORMAL

## 2024-11-01 PROCEDURE — 77336 RADIATION PHYSICS CONSULT: CPT | Performed by: STUDENT IN AN ORGANIZED HEALTH CARE EDUCATION/TRAINING PROGRAM

## 2024-11-01 PROCEDURE — 77373 STRTCTC BDY RAD THER TX DLVR: CPT | Performed by: STUDENT IN AN ORGANIZED HEALTH CARE EDUCATION/TRAINING PROGRAM

## 2024-11-01 ASSESSMENT — PAIN SCALES - GENERAL: PAINLEVEL_OUTOF10: 0-NO PAIN

## 2024-11-05 ENCOUNTER — HOSPITAL ENCOUNTER (OUTPATIENT)
Dept: RADIOLOGY | Facility: HOSPITAL | Age: 74
Discharge: HOME | End: 2024-11-05
Payer: COMMERCIAL

## 2024-11-05 DIAGNOSIS — I71.21 ANEURYSM OF ASCENDING AORTA WITHOUT RUPTURE (CMS-HCC): ICD-10-CM

## 2024-11-05 PROCEDURE — 71250 CT THORAX DX C-: CPT

## 2024-11-08 ENCOUNTER — DOCUMENTATION (OUTPATIENT)
Dept: RADIATION ONCOLOGY | Facility: CLINIC | Age: 74
End: 2024-11-08
Payer: COMMERCIAL

## 2024-11-08 NOTE — PROGRESS NOTES
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 Subsequent development of a solitary biopsy-proven site of oligometastatasis to the RUL lung. Of note, biopsy showed ER+ ID-negative HER2 equivocal and amplified in 5-10% of the carcinoma, the remaining 90-95% was not amplified. She discussed surgery with Dr. Dixon however wished to pursue nonsurgical management. S/p SBRT 50 Gy in 5 fractions completed as below.    Radiation Treatment Summary:    SBRT: Right Lung    Treatment Period Technique Fraction Dose Fractions Total Dose   Course 1 10/23/2024-2024  (days elapsed: 9)         RUL 10/23/2024-2024 SBRT 1000 / 1,000 cGy 5 / 5 5000 / 5,000 cGy       Please see the patient's [facility] chart for further details regarding the radiation plan, including beam energy.    Concurrent Chemotherapy:  none    CTCAE Toxicity Overview:   Toxicity Assessment          2024    14:31   Toxicity Assessment   Adverse Events Reviewed (WDL) No (Exceptions to WDL)   Treatment Site Thoracic   Fatigue Grade 1   Dyspnea Grade 0       no changes from baseline     Patient Disposition: Patient to return to clinic in 3 months with a CT prior and was instructed to reach out with any questions or concerns in the meantime.     Thank you for allowing me to participate in this patient's care.    John Simmons MD  Department of Radiation Oncology  [facility]    Portions of this note were generated using voice recognition software, and may be subject to transcription errors.

## 2024-11-12 ENCOUNTER — TELEMEDICINE (OUTPATIENT)
Dept: CARDIAC SURGERY | Facility: CLINIC | Age: 74
End: 2024-11-12
Payer: COMMERCIAL

## 2024-11-12 DIAGNOSIS — I77.819 AORTIC DILATATION (CMS-HCC): Primary | ICD-10-CM

## 2024-11-12 PROCEDURE — 1159F MED LIST DOCD IN RCRD: CPT

## 2024-11-12 PROCEDURE — 1126F AMNT PAIN NOTED NONE PRSNT: CPT

## 2024-11-12 PROCEDURE — 99443 PR PHYS/QHP TELEPHONE EVALUATION 21-30 MIN: CPT

## 2024-11-12 RX ORDER — ADHESIVE BANDAGE
30 BANDAGE TOPICAL DAILY PRN
COMMUNITY

## 2024-11-12 RX ORDER — GUAIFENESIN 100 MG/5ML
5 SOLUTION ORAL EVERY 4 HOURS PRN
COMMUNITY

## 2024-11-12 RX ORDER — KETOCONAZOLE 20 MG/ML
1 SHAMPOO, SUSPENSION TOPICAL DAILY
COMMUNITY

## 2024-11-12 RX ORDER — HYDROCORTISONE 1 %
1 CREAM (GRAM) TOPICAL 2 TIMES DAILY PRN
COMMUNITY

## 2024-11-12 RX ORDER — DOCUSATE SODIUM 100 MG/1
100 CAPSULE, LIQUID FILLED ORAL DAILY
COMMUNITY

## 2024-11-12 RX ORDER — ACETAMINOPHEN 500 MG
5000 TABLET ORAL DAILY
COMMUNITY

## 2024-11-12 ASSESSMENT — ENCOUNTER SYMPTOMS
CHILLS: 0
FEVER: 0
COUGH: 0
LOSS OF SENSATION IN FEET: 0
OCCASIONAL FEELINGS OF UNSTEADINESS: 1
DEPRESSION: 0
IRREGULAR HEARTBEAT: 0
WHEEZING: 0
DECREASED APPETITE: 0

## 2024-11-12 ASSESSMENT — PAIN SCALES - GENERAL: PAINLEVEL_OUTOF10: 0-NO PAIN

## 2024-11-12 NOTE — PROGRESS NOTES
Subjective   Adriana Holder is a 74 y.o. female.    Chief Complaint:  Follow-up (Adriana has a follow up visit with CT scan prior.)  A telephone visit (audio only) between the patient (at the originating site) and the provider (at the distant site) was utilized to provide this telehealth service. ~ Verbal consent was requested and obtained from Adriana Holder on this date, 11/12/2024 09:30 AM , for a telehealth visit.    HPI  MsAlicia Holder is a 74-year-old female who presents for 6-month follow-up for aortic aneurysm.  She states that she is currently doing well and has recently completed radiation therapy. Denies chest pain, lower extremity edema, dizziness, orthopnea, and syncopal episodes.  She does note occasional shortness of breath after eating breakfast that quickly goes away.  She had a recent CT scan that will be reviewed and discussed.    Review of Systems   Constitutional: Negative for chills, decreased appetite and fever.   Cardiovascular:  Negative for irregular heartbeat.   Respiratory:  Negative for cough and wheezing.      Objective   Psychiatric:         Attention and Perception: Attention normal.         Speech: Speech normal.         Behavior: Behavior normal.         Thought Content: Thought content normal.       Assessment/Plan   Adriana is doing well overall.  Upon reviewing her medications she was not sure which meds she was currently taking.  She resides in a assisted living facility so I asked to speak with her nurse.  Nurse was unavailable to go over the medications so this is not currently updated.  I personally reviewed and we discussed that her CT scan shows that the aortic aneurysm is stable and that she is being followed for her lung nodules and has recently completed her radiation therapy.  We will plan on a repeat CT scan a year from now and an office visit to follow.    Plan:  CT scan order entered.  Continue regular follow-up with oncology, cardiology. and primary care.  Call with  concerns, issues, and/or questions.    Time: 35 minutes

## 2024-11-26 ENCOUNTER — LAB REQUISITION (OUTPATIENT)
Dept: LAB | Facility: HOSPITAL | Age: 74
End: 2024-11-26
Payer: COMMERCIAL

## 2024-11-26 DIAGNOSIS — C50.911 MALIGNANT NEOPLASM OF UNSPECIFIED SITE OF RIGHT FEMALE BREAST: ICD-10-CM

## 2024-11-26 DIAGNOSIS — D68.32 HEMORRHAGIC DISORDER DUE TO EXTRINSIC CIRCULATING ANTICOAGULANTS (MULTI): ICD-10-CM

## 2024-11-26 LAB
ALBUMIN SERPL BCP-MCNC: 4 G/DL (ref 3.4–5)
ALP SERPL-CCNC: 75 U/L (ref 33–136)
ALT SERPL W P-5'-P-CCNC: 18 U/L (ref 7–45)
ANION GAP SERPL CALC-SCNC: 13 MMOL/L (ref 10–20)
AST SERPL W P-5'-P-CCNC: 21 U/L (ref 9–39)
BASOPHILS # BLD AUTO: 0.02 X10*3/UL (ref 0–0.1)
BASOPHILS NFR BLD AUTO: 0.3 %
BILIRUB SERPL-MCNC: 0.9 MG/DL (ref 0–1.2)
BUN SERPL-MCNC: 23 MG/DL (ref 6–23)
CALCIUM SERPL-MCNC: 9.2 MG/DL (ref 8.6–10.3)
CANCER AG27-29 SERPL-ACNC: 42 U/ML (ref 0–38.6)
CHLORIDE SERPL-SCNC: 104 MMOL/L (ref 98–107)
CO2 SERPL-SCNC: 28 MMOL/L (ref 21–32)
CREAT SERPL-MCNC: 1 MG/DL (ref 0.5–1.05)
EGFRCR SERPLBLD CKD-EPI 2021: 59 ML/MIN/1.73M*2
EOSINOPHIL # BLD AUTO: 0.23 X10*3/UL (ref 0–0.4)
EOSINOPHIL NFR BLD AUTO: 3.7 %
ERYTHROCYTE [DISTWIDTH] IN BLOOD BY AUTOMATED COUNT: 13 % (ref 11.5–14.5)
GLUCOSE SERPL-MCNC: 93 MG/DL (ref 74–99)
HCT VFR BLD AUTO: 37.7 % (ref 36–46)
HGB BLD-MCNC: 12.2 G/DL (ref 12–16)
IMM GRANULOCYTES # BLD AUTO: 0.02 X10*3/UL (ref 0–0.5)
IMM GRANULOCYTES NFR BLD AUTO: 0.3 % (ref 0–0.9)
LYMPHOCYTES # BLD AUTO: 1.81 X10*3/UL (ref 0.8–3)
LYMPHOCYTES NFR BLD AUTO: 29.4 %
MCH RBC QN AUTO: 30.3 PG (ref 26–34)
MCHC RBC AUTO-ENTMCNC: 32.4 G/DL (ref 32–36)
MCV RBC AUTO: 94 FL (ref 80–100)
MONOCYTES # BLD AUTO: 0.71 X10*3/UL (ref 0.05–0.8)
MONOCYTES NFR BLD AUTO: 11.5 %
NEUTROPHILS # BLD AUTO: 3.36 X10*3/UL (ref 1.6–5.5)
NEUTROPHILS NFR BLD AUTO: 54.8 %
NRBC BLD-RTO: 0 /100 WBCS (ref 0–0)
PLATELET # BLD AUTO: 189 X10*3/UL (ref 150–450)
POTASSIUM SERPL-SCNC: 4.2 MMOL/L (ref 3.5–5.3)
PROT SERPL-MCNC: 6.8 G/DL (ref 6.4–8.2)
RBC # BLD AUTO: 4.03 X10*6/UL (ref 4–5.2)
SODIUM SERPL-SCNC: 141 MMOL/L (ref 136–145)
WBC # BLD AUTO: 6.2 X10*3/UL (ref 4.4–11.3)

## 2024-11-26 PROCEDURE — 85025 COMPLETE CBC W/AUTO DIFF WBC: CPT | Mod: OUT | Performed by: INTERNAL MEDICINE

## 2024-11-26 PROCEDURE — 36415 COLL VENOUS BLD VENIPUNCTURE: CPT | Mod: OUT | Performed by: INTERNAL MEDICINE

## 2024-11-26 PROCEDURE — 80053 COMPREHEN METABOLIC PANEL: CPT | Mod: OUT | Performed by: INTERNAL MEDICINE

## 2024-11-26 PROCEDURE — 86300 IMMUNOASSAY TUMOR CA 15-3: CPT | Mod: OUT,PARLAB | Performed by: INTERNAL MEDICINE

## 2024-12-02 ENCOUNTER — TRANSCRIBE ORDERS (OUTPATIENT)
Dept: SCHEDULING | Age: 74
End: 2024-12-02
Payer: COMMERCIAL

## 2024-12-02 DIAGNOSIS — Z51.81 ENCOUNTER FOR THERAPEUTIC DRUG LEVEL MONITORING: ICD-10-CM

## 2024-12-02 DIAGNOSIS — Z17.0 ESTROGEN RECEPTOR POSITIVE STATUS (ER+): Primary | ICD-10-CM

## 2024-12-02 DIAGNOSIS — Z79.899 OTHER LONG TERM (CURRENT) DRUG THERAPY: ICD-10-CM

## 2024-12-16 ENCOUNTER — HOSPITAL ENCOUNTER (OUTPATIENT)
Dept: RADIOLOGY | Facility: CLINIC | Age: 74
Discharge: HOME | End: 2024-12-16
Payer: COMMERCIAL

## 2024-12-16 DIAGNOSIS — C50.311 MALIGNANT NEOPLASM OF LOWER-INNER QUADRANT OF RIGHT FEMALE BREAST: ICD-10-CM

## 2024-12-16 DIAGNOSIS — Z03.89 ENCOUNTER FOR OBSERVATION FOR OTHER SUSPECTED DISEASES AND CONDITIONS RULED OUT: ICD-10-CM

## 2024-12-16 DIAGNOSIS — R91.1 SOLITARY PULMONARY NODULE: ICD-10-CM

## 2024-12-16 DIAGNOSIS — Z17.0 ESTROGEN RECEPTOR POSITIVE STATUS (ER+): ICD-10-CM

## 2024-12-16 PROCEDURE — A9552 F18 FDG: HCPCS

## 2024-12-16 PROCEDURE — 78815 PET IMAGE W/CT SKULL-THIGH: CPT | Mod: PET TUMOR SUBSQ TX STRATEGY | Performed by: STUDENT IN AN ORGANIZED HEALTH CARE EDUCATION/TRAINING PROGRAM

## 2024-12-16 PROCEDURE — 78815 PET IMAGE W/CT SKULL-THIGH: CPT | Mod: PS

## 2024-12-16 PROCEDURE — 3430000001 HC RX 343 DIAGNOSTIC RADIOPHARMACEUTICALS

## 2024-12-16 RX ORDER — FLUDEOXYGLUCOSE F 18 200 MCI/ML
11.6 INJECTION, SOLUTION INTRAVENOUS
Status: COMPLETED | OUTPATIENT
Start: 2024-12-16 | End: 2024-12-16

## 2025-01-24 ENCOUNTER — LAB REQUISITION (OUTPATIENT)
Dept: LAB | Facility: HOSPITAL | Age: 75
End: 2025-01-24
Payer: COMMERCIAL

## 2025-01-24 DIAGNOSIS — I48.91 UNSPECIFIED ATRIAL FIBRILLATION (MULTI): ICD-10-CM

## 2025-01-24 LAB
ALBUMIN SERPL BCP-MCNC: 3.7 G/DL (ref 3.4–5)
ALP SERPL-CCNC: 66 U/L (ref 33–136)
ALT SERPL W P-5'-P-CCNC: 12 U/L (ref 7–45)
ANION GAP SERPL CALC-SCNC: 11 MMOL/L (ref 10–20)
AST SERPL W P-5'-P-CCNC: 14 U/L (ref 9–39)
BILIRUB SERPL-MCNC: 0.7 MG/DL (ref 0–1.2)
BUN SERPL-MCNC: 26 MG/DL (ref 6–23)
CALCIUM SERPL-MCNC: 8.8 MG/DL (ref 8.6–10.3)
CHLORIDE SERPL-SCNC: 106 MMOL/L (ref 98–107)
CO2 SERPL-SCNC: 28 MMOL/L (ref 21–32)
CREAT SERPL-MCNC: 1.08 MG/DL (ref 0.5–1.05)
EGFRCR SERPLBLD CKD-EPI 2021: 53 ML/MIN/1.73M*2
GLUCOSE SERPL-MCNC: 105 MG/DL (ref 74–99)
POTASSIUM SERPL-SCNC: 4.3 MMOL/L (ref 3.5–5.3)
PROT SERPL-MCNC: 6.5 G/DL (ref 6.4–8.2)
SODIUM SERPL-SCNC: 141 MMOL/L (ref 136–145)

## 2025-01-24 PROCEDURE — 36415 COLL VENOUS BLD VENIPUNCTURE: CPT | Mod: OUT | Performed by: INTERNAL MEDICINE

## 2025-01-24 PROCEDURE — 84075 ASSAY ALKALINE PHOSPHATASE: CPT | Mod: OUT | Performed by: INTERNAL MEDICINE

## 2025-02-04 DIAGNOSIS — C50.911 BREAST CANCER METASTASIZED TO LUNG, RIGHT (MULTI): Primary | ICD-10-CM

## 2025-02-04 DIAGNOSIS — C78.00 BREAST CANCER METASTASIZED TO LUNG, RIGHT (MULTI): Primary | ICD-10-CM

## 2025-02-04 NOTE — PROGRESS NOTES
Patient was initially scheduled for CT CAP with contrast and a follow-up with me this week.  She did not show for her CT scan.  Since the completion of her radiation treatment she underwent a PET/CT with her medical oncologist which showed evidence of disease  progression, and she is currently receiving treatments with her oncologist.  Will cancel CT CAP and place new order for a CT chest without contrast for purposes of lung SBRT follow-up.    Thank you for allowing me to participate in this patient's care.    John Simmons MD  Department of Radiation Oncology  [facility]    Portions of this note were generated using voice recognition software, and may be subject to transcription errors.

## 2025-02-06 ENCOUNTER — APPOINTMENT (OUTPATIENT)
Dept: RADIATION ONCOLOGY | Facility: CLINIC | Age: 75
End: 2025-02-06
Payer: COMMERCIAL

## 2025-02-19 ENCOUNTER — HOSPITAL ENCOUNTER (OUTPATIENT)
Dept: RADIATION ONCOLOGY | Facility: CLINIC | Age: 75
Setting detail: RADIATION/ONCOLOGY SERIES
Discharge: HOME | End: 2025-02-19
Payer: COMMERCIAL

## 2025-02-19 VITALS
OXYGEN SATURATION: 96 % | RESPIRATION RATE: 18 BRPM | DIASTOLIC BLOOD PRESSURE: 67 MMHG | HEART RATE: 61 BPM | SYSTOLIC BLOOD PRESSURE: 101 MMHG | TEMPERATURE: 97.3 F

## 2025-02-19 DIAGNOSIS — C78.00 BREAST CANCER METASTASIZED TO LUNG, RIGHT (MULTI): Primary | ICD-10-CM

## 2025-02-19 DIAGNOSIS — C50.911 BREAST CANCER METASTASIZED TO LUNG, RIGHT (MULTI): Primary | ICD-10-CM

## 2025-02-19 PROCEDURE — 99213 OFFICE O/P EST LOW 20 MIN: CPT | Performed by: STUDENT IN AN ORGANIZED HEALTH CARE EDUCATION/TRAINING PROGRAM

## 2025-02-19 ASSESSMENT — ENCOUNTER SYMPTOMS
EYES NEGATIVE: 1
CONSTITUTIONAL NEGATIVE: 1
RESPIRATORY NEGATIVE: 1
MUSCULOSKELETAL NEGATIVE: 1
HEMATOLOGIC/LYMPHATIC NEGATIVE: 1
PSYCHIATRIC NEGATIVE: 1
CARDIOVASCULAR NEGATIVE: 1
NEUROLOGICAL NEGATIVE: 1
ENDOCRINE NEGATIVE: 1
GASTROINTESTINAL NEGATIVE: 1

## 2025-02-19 ASSESSMENT — PAIN SCALES - GENERAL: PAINLEVEL_OUTOF10: 0-NO PAIN

## 2025-02-19 NOTE — PROGRESS NOTES
Radiation Oncology Nursing Note    Pain: The patient's current pain level was assessed.  They report currently having a pain of 0 out of 10.  They feel their pain is under control without the use of pain medications.    Review of Systems:  Review of Systems   Constitutional: Negative.    HENT:   Positive for hearing loss.    Eyes: Negative.    Respiratory: Negative.     Cardiovascular: Negative.    Gastrointestinal: Negative.    Endocrine: Negative.    Genitourinary: Negative.     Musculoskeletal: Negative.    Skin: Negative.    Neurological: Negative.    Hematological: Negative.    Psychiatric/Behavioral: Negative.

## 2025-02-19 NOTE — PROGRESS NOTES
Radiation Oncology Follow-Up    Patient Name:  Adriana Holder  MRN:  [id number]  :  1950    Primary Care Provider: Senthil Landers MD  Care Team: Patient Care Team:  Senthil Landers MD as PCP - General (Internal Medicine)  Abelardo Vidales MD as Consulting Physician (Cardiology)    Date of Service: 2025     SUBJECTIVE  History of Present Illness:   Adriana Holder is a 74 y.o. female with a history of pT2N0 right breast IDC (ER+ WV+ HER2-neg) s/p BCS, right breast RT in , and Arimidex. Subsequent development of a solitary biopsy-proven site of oligometastatasis to the RUL lung. Of note, biopsy showed ER+ WV-negative HER2 equivocal and amplified in 5-10% of the carcinoma, the remaining 90-95% was not amplified. She discussed surgery with Dr. Dixon however wished to pursue nonsurgical management. S/p SBRT 50 Gy in 5 fractions completed 2024.      She returns today for follow-up. After the completion of her lung SBRT course, she underwent restaging PET/CT on 2024.  This showed interval decrease FDG avidity of the treated right upper lobe pulmonary nodule, with a newly developed minimally avid nodularity anterior to this nodule in a linear distribution which could represent postbiopsy or posttreatment changes, newly developed FDG avid enlarged right paratracheal node, mildly avid right hilar and left para-aortic nodes, and postsurgical changes in the right breast. She underwent EBUS with biopsy of the 4R node on 2025, pathology showing adenocarcinoma  ER+ (90%) WV-negative HER2 negative 1+.   She is working with her medical oncologist Dr. Frye for systemic therapy, and undergoes port placement tomorrow.  She states no subjective issues after the completion of her lung radiotherapy course.  She denies new or worsening shortness of breath   or cough.  Denies chest pains.       Treatment Rendered:   SBRT: Right Lung (Resolved)    Treatment Period Technique Fraction Dose Fractions Total Dose   Course  1 10/23/2024-11/1/2024  (days elapsed: 9)         RUL 10/23/2024-11/1/2024 SBRT 1000 / 1,000 cGy 5 / 5 5000 / 5,000 cGy       Review of Systems:   Review of Systems - Oncology  - please see nursing note    Performance Status:   The Karnofsky performance scale today is 50, Requires considerable assistance and frequent medical care (ECOG equivalent 2).      OBJECTIVE  /67   Pulse 61   Temp 36.3 °C (97.3 °F)   Resp 18   SpO2 96%    Physical Exam  Vitals reviewed.   Constitutional:       General: She is not in acute distress.  HENT:      Head: Normocephalic and atraumatic.   Cardiovascular:      Rate and Rhythm: Normal rate and regular rhythm.   Pulmonary:      Effort: Pulmonary effort is normal. No respiratory distress.      Breath sounds: No wheezing.   Abdominal:      General: There is no distension.   Skin:     Findings: No rash.   Neurological:      Mental Status: She is alert and oriented to person, place, and time.   Psychiatric:         Mood and Affect: Mood normal.         Behavior: Behavior normal.           ASSESSMENT:  Adriana Holder is a 74 y.o. female with a history of pT2N0 right breast IDC (ER+ SD+ HER2-neg) s/p BCS, right breast RT in 2020, and Arimidex. Subsequent development of a solitary biopsy-proven site of oligometastatasis to the RUL lung. Of note, biopsy showed ER+ SD-negative HER2 equivocal and amplified in 5-10% of the carcinoma, the remaining 90-95% was not amplified. She discussed surgery with Dr. Dixon however wished to pursue nonsurgical management. S/p SBRT 50 Gy in 5 fractions completed 11/2024. Shortly following treatment she progressed in the mediastinum, s/p FNS of station 4 node showing adenocarcinoma, ER+ SD-neg HER2 1+.   She is working with Dr. Frye of medical oncology and is planned for port placement tomorrow, considering faslodex +/- addition of HER2 therapy based on performance status.    PLAN:   She has recovered appropriately from her SBRT course.  Her PET/CT from  December 2024 showed an interval response of the treated lung lesion, however unfortunately showed progression in the mediastinum.   I agree with the plan to proceed with systemic therapy for her progressive disease.  She will continue to follow her medical oncology team, and will follow-up in my clinic as needed.    NCCN Guidelines were applicable to guide this patients treatment plan.    Thank you for allowing me to participate in this patient's care.    John Simmons MD  Department of Radiation Oncology  [facility]    Portions of this note were generated using voice recognition software, and may be subject to transcription errors.

## 2025-03-18 ENCOUNTER — LAB REQUISITION (OUTPATIENT)
Dept: LAB | Facility: HOSPITAL | Age: 75
End: 2025-03-18
Payer: COMMERCIAL

## 2025-03-18 DIAGNOSIS — N18.9 CHRONIC KIDNEY DISEASE, UNSPECIFIED: ICD-10-CM

## 2025-03-18 DIAGNOSIS — I48.91 UNSPECIFIED ATRIAL FIBRILLATION (MULTI): ICD-10-CM

## 2025-03-18 LAB
ALBUMIN SERPL BCP-MCNC: 3.9 G/DL (ref 3.4–5)
ALP SERPL-CCNC: 65 U/L (ref 33–136)
ALT SERPL W P-5'-P-CCNC: 13 U/L (ref 7–45)
ANION GAP SERPL CALC-SCNC: 11 MMOL/L (ref 10–20)
AST SERPL W P-5'-P-CCNC: 16 U/L (ref 9–39)
BILIRUB SERPL-MCNC: 0.8 MG/DL (ref 0–1.2)
BUN SERPL-MCNC: 21 MG/DL (ref 6–23)
CALCIUM SERPL-MCNC: 8.7 MG/DL (ref 8.6–10.3)
CHLORIDE SERPL-SCNC: 106 MMOL/L (ref 98–107)
CO2 SERPL-SCNC: 27 MMOL/L (ref 21–32)
CREAT SERPL-MCNC: 0.97 MG/DL (ref 0.5–1.05)
EGFRCR SERPLBLD CKD-EPI 2021: 61 ML/MIN/1.73M*2
ERYTHROCYTE [DISTWIDTH] IN BLOOD BY AUTOMATED COUNT: 13.2 % (ref 11.5–14.5)
GLUCOSE SERPL-MCNC: 105 MG/DL (ref 74–99)
HCT VFR BLD AUTO: 36.7 % (ref 36–46)
HGB BLD-MCNC: 11.9 G/DL (ref 12–16)
MCH RBC QN AUTO: 30.9 PG (ref 26–34)
MCHC RBC AUTO-ENTMCNC: 32.4 G/DL (ref 32–36)
MCV RBC AUTO: 95 FL (ref 80–100)
NRBC BLD-RTO: 0 /100 WBCS (ref 0–0)
PLATELET # BLD AUTO: 195 X10*3/UL (ref 150–450)
POTASSIUM SERPL-SCNC: 4.1 MMOL/L (ref 3.5–5.3)
PROT SERPL-MCNC: 6.6 G/DL (ref 6.4–8.2)
RBC # BLD AUTO: 3.85 X10*6/UL (ref 4–5.2)
SODIUM SERPL-SCNC: 140 MMOL/L (ref 136–145)
WBC # BLD AUTO: 6 X10*3/UL (ref 4.4–11.3)

## 2025-03-18 PROCEDURE — 80053 COMPREHEN METABOLIC PANEL: CPT | Mod: OUT | Performed by: INTERNAL MEDICINE

## 2025-03-18 PROCEDURE — 36415 COLL VENOUS BLD VENIPUNCTURE: CPT | Mod: OUT | Performed by: INTERNAL MEDICINE

## 2025-03-18 PROCEDURE — 85027 COMPLETE CBC AUTOMATED: CPT | Mod: OUT | Performed by: INTERNAL MEDICINE

## 2025-04-02 ENCOUNTER — OFFICE VISIT (OUTPATIENT)
Dept: CARDIOLOGY | Facility: CLINIC | Age: 75
End: 2025-04-02
Payer: COMMERCIAL

## 2025-04-02 VITALS — SYSTOLIC BLOOD PRESSURE: 128 MMHG | OXYGEN SATURATION: 96 % | DIASTOLIC BLOOD PRESSURE: 84 MMHG | HEART RATE: 96 BPM

## 2025-04-02 DIAGNOSIS — I77.819 AORTIC DILATATION (CMS-HCC): ICD-10-CM

## 2025-04-02 DIAGNOSIS — I10 PRIMARY HYPERTENSION: ICD-10-CM

## 2025-04-02 DIAGNOSIS — E78.00 PURE HYPERCHOLESTEROLEMIA: ICD-10-CM

## 2025-04-02 DIAGNOSIS — C78.00 BREAST CANCER METASTASIZED TO LUNG, RIGHT: ICD-10-CM

## 2025-04-02 DIAGNOSIS — N18.30 STAGE 3 CHRONIC KIDNEY DISEASE, UNSPECIFIED WHETHER STAGE 3A OR 3B CKD (MULTI): ICD-10-CM

## 2025-04-02 DIAGNOSIS — I50.32 CHRONIC HEART FAILURE WITH PRESERVED EJECTION FRACTION: ICD-10-CM

## 2025-04-02 DIAGNOSIS — I48.11 LONGSTANDING PERSISTENT ATRIAL FIBRILLATION (MULTI): Primary | ICD-10-CM

## 2025-04-02 DIAGNOSIS — C50.911 BREAST CANCER METASTASIZED TO LUNG, RIGHT: ICD-10-CM

## 2025-04-02 LAB
Q ONSET: 212 MS
QRS COUNT: 15 BEATS
QRS DURATION: 100 MS
QT INTERVAL: 390 MS
QTC CALCULATION(BAZETT): 474 MS
QTC FREDERICIA: 444 MS
R AXIS: 72 DEGREES
T AXIS: -18 DEGREES
T OFFSET: 407 MS
VENTRICULAR RATE: 89 BPM

## 2025-04-02 PROCEDURE — 93005 ELECTROCARDIOGRAM TRACING: CPT | Performed by: INTERNAL MEDICINE

## 2025-04-02 PROCEDURE — 1036F TOBACCO NON-USER: CPT | Performed by: INTERNAL MEDICINE

## 2025-04-02 PROCEDURE — 1160F RVW MEDS BY RX/DR IN RCRD: CPT | Performed by: INTERNAL MEDICINE

## 2025-04-02 PROCEDURE — 3074F SYST BP LT 130 MM HG: CPT | Performed by: INTERNAL MEDICINE

## 2025-04-02 PROCEDURE — 3079F DIAST BP 80-89 MM HG: CPT | Performed by: INTERNAL MEDICINE

## 2025-04-02 PROCEDURE — 93010 ELECTROCARDIOGRAM REPORT: CPT | Performed by: INTERNAL MEDICINE

## 2025-04-02 PROCEDURE — 99214 OFFICE O/P EST MOD 30 MIN: CPT | Mod: 25 | Performed by: INTERNAL MEDICINE

## 2025-04-02 PROCEDURE — 1159F MED LIST DOCD IN RCRD: CPT | Performed by: INTERNAL MEDICINE

## 2025-04-02 PROCEDURE — 99214 OFFICE O/P EST MOD 30 MIN: CPT | Performed by: INTERNAL MEDICINE

## 2025-04-02 NOTE — PROGRESS NOTES
Subjective  Adriana Holder  is a 74 y.o. year old female who presents for paroxysmal atrial fibrillation aortic dilation.  No chest pain, no palpitations, occ. dyspnea, no edema.    Blood pressure 128/84, pulse 96, SpO2 96%.   Ibuprofen, Tylenol [acetaminophen], and Codeine  Past Medical History:   Diagnosis Date    Arrhythmia     Breast cancer     CHF (congestive heart failure)     Chronic kidney disease     GERD (gastroesophageal reflux disease)     Heart disease     Hypercholesteremia     Hypertension     Osteopenia      Past Surgical History:   Procedure Laterality Date    BREAST LUMPECTOMY       Family History   Problem Relation Name Age of Onset    Coronary artery disease Mother      Heart attack Mother      Cancer Sister          uterine or ovarian cancer     @SOC    Current Outpatient Medications   Medication Sig Dispense Refill    acetaminophen (Tylenol) 325 mg tablet Take 2 tablets (650 mg) by mouth every 8 hours if needed.      alendronate (Fosamax) 70 mg tablet Take 1 tablet (70 mg) by mouth every 7 days. Take in the morning with a full glass of water, on an empty stomach, and do not take anything else by mouth or lie down for the next 30 min.      amiodarone (Pacerone) 200 mg tablet Take 1 tablet (200 mg) by mouth once daily.      ammonium lactate (Lac-Hydrin) 12 % lotion Apply and rub in a thin film to affected areas twice daily (am and pm).      anastrozole (Arimidex) 1 mg tablet Take 1 tablet (1 mg total) by mouth once daily.  Swallow whole with a drink of water.      apixaban (Eliquis) 5 mg tablet Take 1 tablet (5 mg) by mouth 2 times a day.      atorvastatin (Lipitor) 10 mg tablet Take 1 tablet (10 mg) by mouth once daily.      BISACODYL ORAL once daily as needed. Insert 1 suppository rectally once daily      calcium carbonate-vitamin D3 (Oscal-500) 500 mg-10 mcg (400 unit) tablet Take 2 tablets by mouth once daily.      cholecalciferol (Vitamin D3) 5,000 Units tablet Take 1 tablet (5,000 Units) by  mouth once daily.      digoxin (Lanoxin) 125 MCG tablet Take 1 tablet (125 mcg) by mouth once daily.      docusate sodium (Colace) 100 mg capsule Take 1 capsule (100 mg) by mouth once daily.      fluticasone (Flonase) 50 mcg/actuation nasal spray       furosemide (Lasix) 20 mg tablet Take 1 tablet (20 mg) by mouth once daily.      guaiFENesin (Robitussin) 100 mg/5 mL syrup Take 5 mL by mouth every 4 hours if needed for cough.      hydrocortisone 1 % cream Apply 1 Application topically 2 times a day as needed. To neck for rash      ketoconazole (NIZOral) 2 % shampoo Apply 1 Application topically once daily. Wednesday and Saturday      lidocaine (Lidoderm) 5 % patch Place 1 patch over 12 hours on the skin once daily. Remove & discard patch within 12 hours or as directed by MD. (Patient not taking: Reported on 11/12/2024) 5 patch 0    magnesium hydroxide (Milk of Magnesia) 400 mg/5 mL suspension Take 30 mL by mouth once daily as needed for constipation.      meclizine (Antivert) 25 mg tablet Take by mouth. (Patient taking differently: Take 1 tablet (25 mg) by mouth every 8 hours if needed.)      methyl salicylate/menthol (BENGAY GREASELESS TOP) Apply 1 Application topically every 8 hours if needed. To knees      metoprolol tartrate (Lopressor) 50 mg tablet 0.5 tablets. (Patient taking differently: Take 1 tablet by mouth once daily.)      midodrine (Proamatine) 5 mg tablet Take 1 tablet (5 mg) by mouth if needed (for hypotension). (Patient taking differently: Take 1 tablet (5 mg) by mouth 3 times daily (morning, midday, late afternoon).)      omeprazole (PriLOSEC) 40 mg DR capsule Take 20 mg by mouth once daily. Do not crush or chew.      ondansetron (Zofran) 4 mg tablet Take 4 mg by mouth every 8 hours as needed.      potassium chloride CR 10 mEq ER tablet Take 2 tablets (20 mEq) by mouth once daily. Do not crush, chew, or split.      sodium chloride (Ocean) 0.65 % nasal spray Administer 1 spray into each nostril if  needed for congestion. Every one hour as needed for cold symptoms       No current facility-administered medications for this visit.        ROS  Review of Systems   All other systems reviewed and are negative.      Physical Exam  Physical Exam  Constitutional:       Appearance: She is obese.   HENT:      Head: Normocephalic and atraumatic.   Cardiovascular:      Rate and Rhythm: Normal rate. Rhythm irregularly irregular.   Pulmonary:      Effort: Pulmonary effort is normal.      Breath sounds: Normal breath sounds.   Abdominal:      Palpations: Abdomen is soft.   Skin:     General: Skin is warm and dry.   Neurological:      General: No focal deficit present.      Mental Status: She is alert and oriented to person, place, and time.   Psychiatric:         Mood and Affect: Mood normal.         Behavior: Behavior normal.          EKG  Encounter Date: 04/02/25   ECG 12 Lead   Result Value    Ventricular Rate 89    QRS Duration 100    QT Interval 390    QTC Calculation(Bazett) 474    R Axis 72    T Axis -18    QRS Count 15    Q Onset 212    T Offset 407    QTC Fredericia 444    Narrative    Atrial fibrillation  Nonspecific ST and T wave abnormality  Abnormal ECG  When compared with ECG of 18-MAR-2023 05:56,  Previous ECG has undetermined rhythm, needs review  QRS axis Shifted left  Nonspecific T wave abnormality now evident in Anterior leads       Problem List Items Addressed This Visit       Atrial fibrillation (Multi) - Primary     1/21/25 echocardiogram LVEf = 63%         Relevant Orders    ECG 12 Lead (Completed)    Follow Up In Cardiology    CKD (chronic kidney disease), stage III (Multi)    Hyperlipidemia    Hypertension    Chronic heart failure with preserved ejection fraction    Relevant Orders    Follow Up In Cardiology    Breast cancer metastasized to lung, right    Aortic dilatation (CMS-HCC)     1/12/25 echocardiogram aortic root sinus 4.3 cm and mid ascendign aorta 4.4 cm,  Also followed by Dr. forte               Same meds  Return 6 months with EKG      Abelardo Vidales MD

## 2025-04-02 NOTE — ASSESSMENT & PLAN NOTE
1/12/25 echocardiogram aortic root sinus 4.3 cm and mid ascendign aorta 4.4 cm,  Also followed by Dr. forte

## 2025-08-08 ENCOUNTER — LAB REQUISITION (OUTPATIENT)
Dept: LAB | Facility: HOSPITAL | Age: 75
End: 2025-08-08
Payer: COMMERCIAL

## 2025-08-08 DIAGNOSIS — I13.0 HYPERTENSIVE HEART AND CHRONIC KIDNEY DISEASE WITH HEART FAILURE AND STAGE 1 THROUGH STAGE 4 CHRONIC KIDNEY DISEASE, OR UNSPECIFIED CHRONIC KIDNEY DISEASE: ICD-10-CM

## 2025-08-08 LAB
ANION GAP SERPL CALC-SCNC: 11 MMOL/L (ref 10–20)
BUN SERPL-MCNC: 18 MG/DL (ref 6–23)
CALCIUM SERPL-MCNC: 8.9 MG/DL (ref 8.6–10.3)
CHLORIDE SERPL-SCNC: 102 MMOL/L (ref 98–107)
CO2 SERPL-SCNC: 29 MMOL/L (ref 21–32)
CREAT SERPL-MCNC: 0.94 MG/DL (ref 0.5–1.05)
EGFRCR SERPLBLD CKD-EPI 2021: 63 ML/MIN/1.73M*2
ERYTHROCYTE [DISTWIDTH] IN BLOOD BY AUTOMATED COUNT: 13.7 % (ref 11.5–14.5)
GLUCOSE SERPL-MCNC: 176 MG/DL (ref 74–99)
HCT VFR BLD AUTO: 37.1 % (ref 36–46)
HGB BLD-MCNC: 11.7 G/DL (ref 12–16)
MCH RBC QN AUTO: 30.2 PG (ref 26–34)
MCHC RBC AUTO-ENTMCNC: 31.5 G/DL (ref 32–36)
MCV RBC AUTO: 96 FL (ref 80–100)
NRBC BLD-RTO: 0 /100 WBCS (ref 0–0)
PLATELET # BLD AUTO: 202 X10*3/UL (ref 150–450)
POTASSIUM SERPL-SCNC: 3.7 MMOL/L (ref 3.5–5.3)
RBC # BLD AUTO: 3.87 X10*6/UL (ref 4–5.2)
SODIUM SERPL-SCNC: 138 MMOL/L (ref 136–145)
WBC # BLD AUTO: 7.1 X10*3/UL (ref 4.4–11.3)

## 2025-08-08 PROCEDURE — 36415 COLL VENOUS BLD VENIPUNCTURE: CPT | Mod: OUT | Performed by: INTERNAL MEDICINE

## 2025-08-08 PROCEDURE — 80048 BASIC METABOLIC PNL TOTAL CA: CPT | Mod: OUT | Performed by: INTERNAL MEDICINE

## 2025-08-08 PROCEDURE — 85027 COMPLETE CBC AUTOMATED: CPT | Mod: OUT | Performed by: INTERNAL MEDICINE
